# Patient Record
Sex: MALE | Race: WHITE | NOT HISPANIC OR LATINO | Employment: FULL TIME | URBAN - METROPOLITAN AREA
[De-identification: names, ages, dates, MRNs, and addresses within clinical notes are randomized per-mention and may not be internally consistent; named-entity substitution may affect disease eponyms.]

---

## 2017-06-13 ENCOUNTER — TRANSCRIBE ORDERS (OUTPATIENT)
Dept: ADMINISTRATIVE | Age: 42
End: 2017-06-13

## 2017-06-13 ENCOUNTER — APPOINTMENT (OUTPATIENT)
Dept: LAB | Age: 42
End: 2017-06-13
Payer: COMMERCIAL

## 2017-06-13 DIAGNOSIS — E03.9 UNSPECIFIED HYPOTHYROIDISM: ICD-10-CM

## 2017-06-13 DIAGNOSIS — E03.9 UNSPECIFIED HYPOTHYROIDISM: Primary | ICD-10-CM

## 2017-06-13 LAB
T4 FREE SERPL-MCNC: 1.03 NG/DL (ref 0.76–1.46)
TSH SERPL DL<=0.05 MIU/L-ACNC: 5.36 UIU/ML (ref 0.36–3.74)

## 2017-06-13 PROCEDURE — 36415 COLL VENOUS BLD VENIPUNCTURE: CPT

## 2017-06-13 PROCEDURE — 84439 ASSAY OF FREE THYROXINE: CPT

## 2017-06-13 PROCEDURE — 84443 ASSAY THYROID STIM HORMONE: CPT

## 2017-08-26 ENCOUNTER — APPOINTMENT (OUTPATIENT)
Dept: LAB | Age: 42
End: 2017-08-26
Payer: COMMERCIAL

## 2017-08-26 ENCOUNTER — TRANSCRIBE ORDERS (OUTPATIENT)
Dept: ADMINISTRATIVE | Age: 42
End: 2017-08-26

## 2017-08-26 DIAGNOSIS — E03.9 UNSPECIFIED HYPOTHYROIDISM: ICD-10-CM

## 2017-08-26 DIAGNOSIS — E03.9 UNSPECIFIED HYPOTHYROIDISM: Primary | ICD-10-CM

## 2017-08-26 LAB
T4 FREE SERPL-MCNC: 1.02 NG/DL (ref 0.76–1.46)
TSH SERPL DL<=0.05 MIU/L-ACNC: 1.28 UIU/ML (ref 0.36–3.74)

## 2017-08-26 PROCEDURE — 84443 ASSAY THYROID STIM HORMONE: CPT

## 2017-08-26 PROCEDURE — 36415 COLL VENOUS BLD VENIPUNCTURE: CPT

## 2017-08-26 PROCEDURE — 84439 ASSAY OF FREE THYROXINE: CPT

## 2018-03-08 PROCEDURE — 88305 TISSUE EXAM BY PATHOLOGIST: CPT | Performed by: PATHOLOGY

## 2018-03-09 ENCOUNTER — LAB REQUISITION (OUTPATIENT)
Dept: LAB | Facility: HOSPITAL | Age: 43
End: 2018-03-09
Payer: COMMERCIAL

## 2018-03-09 DIAGNOSIS — K52.9 NONINFECTIVE GASTROENTERITIS AND COLITIS: ICD-10-CM

## 2018-03-09 DIAGNOSIS — K64.2 THIRD DEGREE HEMORRHOIDS: ICD-10-CM

## 2018-03-09 DIAGNOSIS — K62.5 HEMORRHAGE OF ANUS AND RECTUM: ICD-10-CM

## 2018-10-04 ENCOUNTER — APPOINTMENT (OUTPATIENT)
Dept: RADIOLOGY | Age: 43
End: 2018-10-04
Attending: FAMILY MEDICINE
Payer: COMMERCIAL

## 2018-10-04 ENCOUNTER — OFFICE VISIT (OUTPATIENT)
Dept: URGENT CARE | Age: 43
End: 2018-10-04
Payer: COMMERCIAL

## 2018-10-04 VITALS
TEMPERATURE: 97.4 F | OXYGEN SATURATION: 99 % | BODY MASS INDEX: 29.78 KG/M2 | SYSTOLIC BLOOD PRESSURE: 137 MMHG | RESPIRATION RATE: 16 BRPM | HEIGHT: 70 IN | WEIGHT: 208 LBS | HEART RATE: 80 BPM | DIASTOLIC BLOOD PRESSURE: 91 MMHG

## 2018-10-04 DIAGNOSIS — T14.90XA INJURY: ICD-10-CM

## 2018-10-04 DIAGNOSIS — S92.415A CLOSED NONDISPLACED FRACTURE OF PROXIMAL PHALANX OF LEFT GREAT TOE, INITIAL ENCOUNTER: Primary | ICD-10-CM

## 2018-10-04 DIAGNOSIS — S99.922A INJURY OF LEFT GREAT TOE, INITIAL ENCOUNTER: ICD-10-CM

## 2018-10-04 PROCEDURE — 73630 X-RAY EXAM OF FOOT: CPT

## 2018-10-04 PROCEDURE — 99213 OFFICE O/P EST LOW 20 MIN: CPT | Performed by: FAMILY MEDICINE

## 2018-10-04 RX ORDER — KRILL/OM-3/DHA/EPA/PHOSPHO/AST 500MG-86MG
1 CAPSULE ORAL DAILY
COMMUNITY

## 2018-10-04 RX ORDER — LEVOTHYROXINE SODIUM 0.12 MG/1
125 TABLET ORAL DAILY
COMMUNITY
End: 2019-03-13 | Stop reason: DRUGHIGH

## 2018-10-04 NOTE — PATIENT INSTRUCTIONS
Rest, elevate left foot, limit activity until seen by Podiatry  Ice to injured area 2 to 3 times a day for 15-20 minutes  Tape toes for comfort  Comfortable footwear  Crutches  Tylenol, or ibuprofen (Advil/Motrin), or try Aleve (please take with food) as needed for pain  Recheck/follow-up with Podiatry as needed    Ronnie Beyer  [phone]

## 2018-10-04 NOTE — PROGRESS NOTES
[facility]        NAME: Irma Bo is a 36 y o  male  : 1975    MRN: [id number]  DATE: 2018  TIME: 7:02 PM    Assessment and Plan   Closed nondisplaced fracture of proximal phalanx of left great toe, initial encounter [S92 415A]  1  Closed nondisplaced fracture of proximal phalanx of left great toe, initial encounter  CANCELED: Post Op Shoe   2  Injury  XR foot 3+ vw left   3  Injury of left great toe, initial encounter  CANCELED: Crutches         Patient Instructions     Patient Instructions   Rest, elevate left foot, limit activity until seen by Podiatry  Ice to injured area 2 to 3 times a day for 15-20 minutes  Tape toes for comfort  Comfortable footwear  Crutches  Tylenol, or ibuprofen (Advil/Motrin), or try Aleve (please take with food) as needed for pain  Recheck/follow-up with Podiatry as needed  Cady Karen  [phone]      Chief Complaint     Chief Complaint   Patient presents with    Foot Injury     BIG TOE BENT BACK DURING MMA CLASS LAST NIGHT    STILL SWOLLEN AND DISCOLORED         History of Present Illness       Patient states his left great toe bent backwards (dorsally) in martial arts class last evening; patient has his left great toe and left 2nd toe taped together; patient states he has crutches at home        Review of Systems   Review of Systems   Musculoskeletal:        Pain base of left great toe         Current Medications       Current Outpatient Prescriptions:     Krill Oil 500 MG CAPS, Take 1 tablet by mouth, Disp: , Rfl:     levothyroxine 125 mcg tablet, Take 125 mcg by mouth daily, Disp: , Rfl:     Current Allergies     Allergies as of 10/04/2018    (No Known Allergies)            The following portions of the patient's history were reviewed and updated as appropriate: allergies, current medications, past family history, past medical history, past social history, past surgical history and problem list      Past Medical History:   Diagnosis Date  Disease of thyroid gland        Past Surgical History:   Procedure Laterality Date    KIDNEY STONE SURGERY  2015       Family History   Problem Relation Age of Onset    No Known Problems Mother     Cancer Father          Medications have been verified          Objective   /91   Pulse 80   Temp (!) 97 4 °F (36 3 °C) (Temporal)   Resp 16   Ht 5' 10" (1 778 m)   Wt 94 3 kg (208 lb)   SpO2 99%   BMI 29 84 kg/m²        Physical Exam     Physical Exam   Musculoskeletal:   Tenderness and slight swelling base of left great toe; intact sensation, capillary refill, and motor exam left great toe       Left foot x-rays - fracture of the base of the proximal phalanx of the left great toe (patient showed x-rays)

## 2018-10-29 ENCOUNTER — APPOINTMENT (OUTPATIENT)
Dept: LAB | Age: 43
End: 2018-10-29
Payer: COMMERCIAL

## 2018-10-29 ENCOUNTER — TRANSCRIBE ORDERS (OUTPATIENT)
Dept: ADMINISTRATIVE | Age: 43
End: 2018-10-29

## 2018-10-29 DIAGNOSIS — M79.675 PAIN IN TOE OF LEFT FOOT: ICD-10-CM

## 2018-10-29 DIAGNOSIS — S92.415S CLOSED NONDISPLACED FRACTURE OF PROXIMAL PHALANX OF LEFT GREAT TOE, SEQUELA: ICD-10-CM

## 2018-10-29 DIAGNOSIS — S92.415S CLOSED NONDISPLACED FRACTURE OF PROXIMAL PHALANX OF LEFT GREAT TOE, SEQUELA: Primary | ICD-10-CM

## 2018-10-29 LAB — 25(OH)D3 SERPL-MCNC: 33.6 NG/ML (ref 30–100)

## 2018-10-29 PROCEDURE — 36415 COLL VENOUS BLD VENIPUNCTURE: CPT

## 2018-10-29 PROCEDURE — 82306 VITAMIN D 25 HYDROXY: CPT

## 2019-03-13 ENCOUNTER — OFFICE VISIT (OUTPATIENT)
Dept: URGENT CARE | Age: 44
End: 2019-03-13
Payer: COMMERCIAL

## 2019-03-13 VITALS
TEMPERATURE: 100.7 F | WEIGHT: 205 LBS | SYSTOLIC BLOOD PRESSURE: 130 MMHG | HEIGHT: 70 IN | BODY MASS INDEX: 29.35 KG/M2 | RESPIRATION RATE: 18 BRPM | OXYGEN SATURATION: 95 % | DIASTOLIC BLOOD PRESSURE: 76 MMHG | HEART RATE: 112 BPM

## 2019-03-13 DIAGNOSIS — R68.89 FLU-LIKE SYMPTOMS: ICD-10-CM

## 2019-03-13 DIAGNOSIS — J02.9 ACUTE PHARYNGITIS, UNSPECIFIED ETIOLOGY: Primary | ICD-10-CM

## 2019-03-13 DIAGNOSIS — J02.9 SORE THROAT: ICD-10-CM

## 2019-03-13 LAB
FLUAV AG SPEC QL: DETECTED
FLUBV AG SPEC QL: NOT DETECTED
RSV B RNA SPEC QL NAA+PROBE: NOT DETECTED
S PYO AG THROAT QL: NEGATIVE

## 2019-03-13 PROCEDURE — 87070 CULTURE OTHR SPECIMN AEROBIC: CPT | Performed by: NURSE PRACTITIONER

## 2019-03-13 PROCEDURE — 87631 RESP VIRUS 3-5 TARGETS: CPT | Performed by: NURSE PRACTITIONER

## 2019-03-13 PROCEDURE — 99213 OFFICE O/P EST LOW 20 MIN: CPT | Performed by: FAMILY MEDICINE

## 2019-03-13 PROCEDURE — 87430 STREP A AG IA: CPT | Performed by: FAMILY MEDICINE

## 2019-03-13 RX ORDER — IBUPROFEN 200 MG
200 TABLET ORAL EVERY 4 HOURS PRN
COMMUNITY

## 2019-03-13 RX ORDER — MELATONIN
1000 DAILY
COMMUNITY
End: 2021-12-27 | Stop reason: ALTCHOICE

## 2019-03-13 RX ORDER — LEVOTHYROXINE SODIUM 137 UG/1
137 TABLET ORAL DAILY
Refills: 0 | COMMUNITY
Start: 2019-03-11 | End: 2021-08-30 | Stop reason: SDUPTHER

## 2019-03-13 RX ORDER — AMOXICILLIN 500 MG/1
500 CAPSULE ORAL EVERY 12 HOURS SCHEDULED
Qty: 20 CAPSULE | Refills: 0 | Status: SHIPPED | OUTPATIENT
Start: 2019-03-13 | End: 2019-03-23

## 2019-03-13 RX ORDER — ACETAMINOPHEN 500 MG
500 TABLET ORAL EVERY 6 HOURS PRN
COMMUNITY

## 2019-03-13 RX ORDER — OSELTAMIVIR PHOSPHATE 75 MG/1
75 CAPSULE ORAL EVERY 12 HOURS SCHEDULED
Qty: 10 CAPSULE | Refills: 0 | Status: SHIPPED | OUTPATIENT
Start: 2019-03-13 | End: 2019-03-18

## 2019-03-13 NOTE — PROGRESS NOTES
[facility]        NAME: Joey Clark is a 39 y o  male  : 1975    MRN: [id number]  DATE: 2019  TIME: 2:57 PM    Assessment and Plan   Acute pharyngitis, unspecified etiology [J02 9]  1  Acute pharyngitis, unspecified etiology  amoxicillin (AMOXIL) 500 mg capsule   2  Sore throat  POCT rapid strepA    Throat culture   3  Flu-like symptoms  oseltamivir (TAMIFLU) 75 mg capsule    Influenza A/B and RSV by PCR         Patient Instructions     Patient Instructions   As we discussed symptoms seem to be consistent with influenza  Flu swab drawn collected today call tomorrow for results  Rapid strep in office was negative  Rest and drink extra fluids  Start Tamiflu if flu testing is negative will start Tamiflu tomorrow  Will also start amoxicillin as there was a exposure to strep and he does have sore throat  Saltwater gargles can be helpful with sore throat  Chloraseptic spray or throat drops  Tylenol or Motrin needed for pain or fever  Follow up with family doctor if no improvement  Go to the ER with any worsening symptoms, chest pain, shortness breath or difficulty breathing  Chief Complaint     Chief Complaint   Patient presents with    Cold Like Symptoms     sore throat and productive cough for 3 days   daughter has strep throat         History of Present Illness   Joey Clark presents to the clinic c/o    This is a 14-year-old male here today with complaints sore throat, body aches, headache, chills and feeling poorly  He states he did have his influenza vaccine this year  He has been taking Tylenol as needed  He states his daughter was recently diagnosed with strep 2 days ago  He denies any shortness of breath or chest pain  He is having some yellow mucus  Review of Systems   Review of Systems   Constitutional: Positive for activity change, chills, fatigue and fever  HENT: Positive for congestion, sinus pressure and sore throat  Negative for sinus pain  Respiratory: Positive for cough  Negative for chest tightness and wheezing  Cardiovascular: Negative  Neurological: Positive for headaches  Psychiatric/Behavioral: Negative  Current Medications     Long-Term Medications   Medication Sig Dispense Refill    cholecalciferol (VITAMIN D3) 1,000 units tablet Take 1,000 Units by mouth daily      ibuprofen (ADVIL) 200 mg tablet Take by mouth      levothyroxine 137 mcg tablet Take 137 mcg by mouth daily  0    [DISCONTINUED] levothyroxine 125 mcg tablet Take 125 mcg by mouth daily         Current Allergies     Allergies as of 03/13/2019    (No Known Allergies)            The following portions of the patient's history were reviewed and updated as appropriate: allergies, current medications, past family history, past medical history, past social history, past surgical history and problem list     Objective   /76 (BP Location: Right arm, Patient Position: Sitting, Cuff Size: Standard)   Pulse (!) 112   Temp (!) 100 7 °F (38 2 °C)   Resp 18   Ht 5' 10" (1 778 m)   Wt 93 kg (205 lb)   SpO2 95%   BMI 29 41 kg/m²        Physical Exam     Physical Exam   Constitutional: He is oriented to person, place, and time  He appears well-developed and well-nourished  HENT:   Right Ear: External ear normal    Left Ear: External ear normal    Posterior pharynx mildly erythemic tonsils 1/4 no exudate   Neck: Normal range of motion  Neck supple  Cardiovascular: Normal rate and regular rhythm  Pulmonary/Chest: Effort normal and breath sounds normal    Neurological: He is alert and oriented to person, place, and time  Skin: Skin is warm and dry  Psychiatric: He has a normal mood and affect  His behavior is normal    Nursing note and vitals reviewed

## 2019-03-13 NOTE — PATIENT INSTRUCTIONS
As we discussed symptoms seem to be consistent with influenza  Flu swab drawn collected today call tomorrow for results  Rapid strep in office was negative  Rest and drink extra fluids  Start Tamiflu if flu testing is negative will start Tamiflu tomorrow  Will also start amoxicillin as there was a exposure to strep and he does have sore throat  Saltwater gargles can be helpful with sore throat  Chloraseptic spray or throat drops  Tylenol or Motrin needed for pain or fever  Follow up with family doctor if no improvement  Go to the ER with any worsening symptoms, chest pain, shortness breath or difficulty breathing

## 2019-03-13 NOTE — LETTER
March 13, 2019     Patient: Wilmar Ricks   YOB: 1975   Date of Visit: 3/13/2019       To Whom It May Concern: It is my medical opinion that Samreen Seo may return to work on 03/18/2019  If you have any questions or concerns, please don't hesitate to call           Sincerely,        KATERINA Stratton    CC: No Recipients

## 2019-03-14 ENCOUNTER — TELEPHONE (OUTPATIENT)
Dept: URGENT CARE | Age: 44
End: 2019-03-14

## 2019-03-14 NOTE — TELEPHONE ENCOUNTER
Message left that influenza test was positive for type a influenza  Patient already has Tamiflu and he is instructed to continue that with hopes that that will shorten the course  Also instructed to follow up with his family doctor if any other concerns

## 2019-03-15 LAB — BACTERIA THROAT CULT: NORMAL

## 2019-06-22 ENCOUNTER — TRANSCRIBE ORDERS (OUTPATIENT)
Dept: ADMINISTRATIVE | Age: 44
End: 2019-06-22

## 2019-06-22 ENCOUNTER — APPOINTMENT (OUTPATIENT)
Dept: LAB | Age: 44
End: 2019-06-22
Payer: COMMERCIAL

## 2019-06-22 DIAGNOSIS — N42.89 ATROPHY OF PROSTATE: ICD-10-CM

## 2019-06-22 DIAGNOSIS — I51.9 MYXEDEMA HEART DISEASE: ICD-10-CM

## 2019-06-22 DIAGNOSIS — E78.2 MIXED HYPERLIPIDEMIA: ICD-10-CM

## 2019-06-22 DIAGNOSIS — D64.9 ANEMIA, UNSPECIFIED TYPE: ICD-10-CM

## 2019-06-22 DIAGNOSIS — D64.9 ANEMIA, UNSPECIFIED TYPE: Primary | ICD-10-CM

## 2019-06-22 DIAGNOSIS — E03.9 MYXEDEMA HEART DISEASE: ICD-10-CM

## 2019-06-22 DIAGNOSIS — R35.0 URINARY FREQUENCY: ICD-10-CM

## 2019-06-22 LAB
ALBUMIN SERPL BCP-MCNC: 4.1 G/DL (ref 3.5–5)
ALP SERPL-CCNC: 72 U/L (ref 46–116)
ALT SERPL W P-5'-P-CCNC: 28 U/L (ref 12–78)
ANION GAP SERPL CALCULATED.3IONS-SCNC: 4 MMOL/L (ref 4–13)
AST SERPL W P-5'-P-CCNC: 14 U/L (ref 5–45)
BASOPHILS # BLD AUTO: 0.02 THOUSANDS/ΜL (ref 0–0.1)
BASOPHILS NFR BLD AUTO: 0 % (ref 0–1)
BILIRUB SERPL-MCNC: 0.37 MG/DL (ref 0.2–1)
BILIRUB UR QL STRIP: NEGATIVE
BUN SERPL-MCNC: 13 MG/DL (ref 5–25)
CALCIUM SERPL-MCNC: 9.3 MG/DL (ref 8.3–10.1)
CHLORIDE SERPL-SCNC: 105 MMOL/L (ref 100–108)
CHOLEST SERPL-MCNC: 207 MG/DL (ref 50–200)
CLARITY UR: CLEAR
CO2 SERPL-SCNC: 26 MMOL/L (ref 21–32)
COLOR UR: YELLOW
CREAT SERPL-MCNC: 0.78 MG/DL (ref 0.6–1.3)
EOSINOPHIL # BLD AUTO: 0.08 THOUSAND/ΜL (ref 0–0.61)
EOSINOPHIL NFR BLD AUTO: 2 % (ref 0–6)
ERYTHROCYTE [DISTWIDTH] IN BLOOD BY AUTOMATED COUNT: 12.9 % (ref 11.6–15.1)
GFR SERPL CREATININE-BSD FRML MDRD: 110 ML/MIN/1.73SQ M
GLUCOSE P FAST SERPL-MCNC: 110 MG/DL (ref 65–99)
GLUCOSE UR STRIP-MCNC: NEGATIVE MG/DL
HCT VFR BLD AUTO: 43.4 % (ref 36.5–49.3)
HDLC SERPL-MCNC: 36 MG/DL (ref 40–60)
HGB BLD-MCNC: 14.3 G/DL (ref 12–17)
HGB UR QL STRIP.AUTO: NEGATIVE
IMM GRANULOCYTES # BLD AUTO: 0.02 THOUSAND/UL (ref 0–0.2)
IMM GRANULOCYTES NFR BLD AUTO: 0 % (ref 0–2)
KETONES UR STRIP-MCNC: NEGATIVE MG/DL
LDLC SERPL CALC-MCNC: 154 MG/DL (ref 0–100)
LEUKOCYTE ESTERASE UR QL STRIP: NEGATIVE
LYMPHOCYTES # BLD AUTO: 0.78 THOUSANDS/ΜL (ref 0.6–4.47)
LYMPHOCYTES NFR BLD AUTO: 16 % (ref 14–44)
MCH RBC QN AUTO: 28.9 PG (ref 26.8–34.3)
MCHC RBC AUTO-ENTMCNC: 32.9 G/DL (ref 31.4–37.4)
MCV RBC AUTO: 88 FL (ref 82–98)
MONOCYTES # BLD AUTO: 0.36 THOUSAND/ΜL (ref 0.17–1.22)
MONOCYTES NFR BLD AUTO: 8 % (ref 4–12)
NEUTROPHILS # BLD AUTO: 3.53 THOUSANDS/ΜL (ref 1.85–7.62)
NEUTS SEG NFR BLD AUTO: 74 % (ref 43–75)
NITRITE UR QL STRIP: NEGATIVE
NONHDLC SERPL-MCNC: 171 MG/DL
NRBC BLD AUTO-RTO: 0 /100 WBCS
PH UR STRIP.AUTO: 6 [PH]
PLATELET # BLD AUTO: 218 THOUSANDS/UL (ref 149–390)
PMV BLD AUTO: 10.9 FL (ref 8.9–12.7)
POTASSIUM SERPL-SCNC: 4.1 MMOL/L (ref 3.5–5.3)
PROT SERPL-MCNC: 7.3 G/DL (ref 6.4–8.2)
PROT UR STRIP-MCNC: NEGATIVE MG/DL
RBC # BLD AUTO: 4.94 MILLION/UL (ref 3.88–5.62)
SODIUM SERPL-SCNC: 135 MMOL/L (ref 136–145)
SP GR UR STRIP.AUTO: 1.02 (ref 1–1.03)
T4 FREE SERPL-MCNC: 1.09 NG/DL (ref 0.76–1.46)
TRIGL SERPL-MCNC: 87 MG/DL
TSH SERPL DL<=0.05 MIU/L-ACNC: 0.38 UIU/ML (ref 0.36–3.74)
UROBILINOGEN UR QL STRIP.AUTO: 0.2 E.U./DL
WBC # BLD AUTO: 4.79 THOUSAND/UL (ref 4.31–10.16)

## 2019-06-22 PROCEDURE — 81003 URINALYSIS AUTO W/O SCOPE: CPT

## 2019-06-22 PROCEDURE — 80053 COMPREHEN METABOLIC PANEL: CPT

## 2019-06-22 PROCEDURE — 85025 COMPLETE CBC W/AUTO DIFF WBC: CPT

## 2019-06-22 PROCEDURE — 84443 ASSAY THYROID STIM HORMONE: CPT

## 2019-06-22 PROCEDURE — 36415 COLL VENOUS BLD VENIPUNCTURE: CPT

## 2019-06-22 PROCEDURE — 80061 LIPID PANEL: CPT

## 2019-06-22 PROCEDURE — 84153 ASSAY OF PSA TOTAL: CPT

## 2019-06-22 PROCEDURE — 84439 ASSAY OF FREE THYROXINE: CPT

## 2019-06-25 LAB — MISCELLANEOUS LAB TEST RESULT: NORMAL

## 2019-09-24 ENCOUNTER — APPOINTMENT (OUTPATIENT)
Dept: RADIOLOGY | Age: 44
End: 2019-09-24
Attending: FAMILY MEDICINE
Payer: COMMERCIAL

## 2019-09-24 ENCOUNTER — OFFICE VISIT (OUTPATIENT)
Dept: URGENT CARE | Age: 44
End: 2019-09-24
Payer: COMMERCIAL

## 2019-09-24 VITALS
DIASTOLIC BLOOD PRESSURE: 81 MMHG | WEIGHT: 215 LBS | OXYGEN SATURATION: 96 % | BODY MASS INDEX: 30.78 KG/M2 | HEART RATE: 77 BPM | SYSTOLIC BLOOD PRESSURE: 135 MMHG | TEMPERATURE: 97.3 F | RESPIRATION RATE: 16 BRPM | HEIGHT: 70 IN

## 2019-09-24 DIAGNOSIS — S20.212A CONTUSION OF LEFT CHEST WALL, INITIAL ENCOUNTER: Primary | ICD-10-CM

## 2019-09-24 DIAGNOSIS — R52 PAIN: ICD-10-CM

## 2019-09-24 LAB
SL AMB  POCT GLUCOSE, UA: NORMAL
SL AMB LEUKOCYTE ESTERASE,UA: NORMAL
SL AMB POCT BILIRUBIN,UA: NORMAL
SL AMB POCT BLOOD,UA: NORMAL
SL AMB POCT CLARITY,UA: CLEAR
SL AMB POCT COLOR,UA: YELLOW
SL AMB POCT KETONES,UA: NORMAL
SL AMB POCT NITRITE,UA: NORMAL
SL AMB POCT PH,UA: 7
SL AMB POCT SPECIFIC GRAVITY,UA: 1.01
SL AMB POCT URINE PROTEIN: NORMAL
SL AMB POCT UROBILINOGEN: 0.2

## 2019-09-24 PROCEDURE — 81002 URINALYSIS NONAUTO W/O SCOPE: CPT | Performed by: FAMILY MEDICINE

## 2019-09-24 PROCEDURE — 71101 X-RAY EXAM UNILAT RIBS/CHEST: CPT

## 2019-09-24 PROCEDURE — 99213 OFFICE O/P EST LOW 20 MIN: CPT | Performed by: FAMILY MEDICINE

## 2019-09-24 NOTE — PATIENT INSTRUCTIONS
Rest, limit heavy activity through next week  Ice to injured area 2 to 3 times a day for 15-20 minutes  Tylenol, or ibuprofen (Advil/Motrin), or try Aleve (please take with food) as needed for pain and inflammation  Recheck/follow-up with family physician  Please go to the hospital emergency department if needed
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
## 2020-01-06 ENCOUNTER — OFFICE VISIT (OUTPATIENT)
Dept: URGENT CARE | Age: 45
End: 2020-01-06
Payer: COMMERCIAL

## 2020-01-06 VITALS
HEART RATE: 94 BPM | WEIGHT: 213 LBS | DIASTOLIC BLOOD PRESSURE: 81 MMHG | RESPIRATION RATE: 18 BRPM | BODY MASS INDEX: 30.49 KG/M2 | SYSTOLIC BLOOD PRESSURE: 120 MMHG | HEIGHT: 70 IN | OXYGEN SATURATION: 98 % | TEMPERATURE: 97.7 F

## 2020-01-06 DIAGNOSIS — J20.8 ACUTE BRONCHITIS DUE TO OTHER SPECIFIED ORGANISMS: Primary | ICD-10-CM

## 2020-01-06 PROCEDURE — 99213 OFFICE O/P EST LOW 20 MIN: CPT | Performed by: PHYSICIAN ASSISTANT

## 2020-01-06 RX ORDER — BENZONATATE 100 MG/1
100 CAPSULE ORAL 3 TIMES DAILY PRN
Qty: 9 CAPSULE | Refills: 0 | Status: SHIPPED | OUTPATIENT
Start: 2020-01-06 | End: 2020-07-10

## 2020-01-06 RX ORDER — ALBUTEROL SULFATE 90 UG/1
1 AEROSOL, METERED RESPIRATORY (INHALATION) EVERY 8 HOURS PRN
Qty: 18 G | Refills: 0 | Status: SHIPPED | OUTPATIENT
Start: 2020-01-06 | End: 2020-01-08

## 2020-01-06 RX ORDER — AZITHROMYCIN 250 MG/1
TABLET, FILM COATED ORAL
Qty: 6 TABLET | Refills: 0 | Status: SHIPPED | OUTPATIENT
Start: 2020-01-06 | End: 2020-01-10

## 2020-01-06 RX ORDER — ALBUTEROL SULFATE 2.5 MG/3ML
2.5 SOLUTION RESPIRATORY (INHALATION) ONCE
Status: COMPLETED | OUTPATIENT
Start: 2020-01-06 | End: 2020-01-06

## 2020-01-06 RX ADMIN — ALBUTEROL SULFATE 2.5 MG: 2.5 SOLUTION RESPIRATORY (INHALATION) at 11:04

## 2020-01-06 NOTE — PATIENT INSTRUCTIONS
Take antibiotics as prescribed  Use Flonase or nasal saline spray for symptomatic treatment  Use inhaler as prescribed if needed  Use Tessalon Perles as prescribed if needed  Warm water gargles  Change toothbrush in 2-3 days  Stay hydrated with lots of water/fluids  Follow-up with PCP in the next 1-2 days for reexamination and to ensure resolution of symptoms  Go to the ED if any fevers, unable to stay hydrated, abdominal pain, chest pain, shortness of breath, chest tightness, wheezing, change in voice, pain or difficulty swallowing, new or worsening symptoms or other concerning symptoms

## 2020-01-06 NOTE — PROGRESS NOTES
[facility]        NAME: Amaury Gann is a 38 y o  male  : 1975    MRN: [id number]  DATE: 2020  TIME: 11:36 AM    Assessment and Plan   Acute bronchitis due to other specified organisms [J20 8]  1  Acute bronchitis due to other specified organisms  albuterol inhalation solution 2 5 mg    azithromycin (ZITHROMAX) 250 mg tablet    benzonatate (TESSALON PERLES) 100 mg capsule    albuterol (VENTOLIN HFA) 90 mcg/act inhaler     Patient feeling well after albuterol treatment  Lungs clear to auscultation bilaterally, no wheezes  Vital signs are stable  Patient has no complaints at this time  Patient requesting discharge at this time  Patient will follow up with PCP in the next 1-2 days or the ER sooner if any new or worsening symptoms    Patient Instructions     Take antibiotics as prescribed  Use Flonase or nasal saline spray for symptomatic treatment  Use inhaler as prescribed if needed  Use Tessalon Perles as prescribed if needed  Warm water gargles  Change toothbrush in 2-3 days  Stay hydrated with lots of water/fluids  Follow-up with PCP in the next 1-2 days for reexamination and to ensure resolution of symptoms  Go to the ED if any fevers, unable to stay hydrated, abdominal pain, chest pain, shortness of breath, chest tightness, wheezing, change in voice, pain or difficulty swallowing, new or worsening symptoms or other concerning symptoms  Chief Complaint     Chief Complaint   Patient presents with   Willistine Cake Like Symptoms     Started New Years Rosy with cough, runny/stuffy nose, fatigue, sore throat and ears blocked  Been taking Musinex and advil cold/sinus  History of Present Illness       62-year-old male presents with intermittent sinus pressure, postnasal drip, intermittent ear pressure, cough that started off dry but is now productive clearish greenish phlegm x1 week    He states he has tried over-the-counter Advil cold and Sinus as well as cough medication with some relief  States it feels like prior sinus infections and bronchitis  States sick contacts with similar cough and cold-like symptoms  States having intermittent sore throat but mainly from the coughing  States eating and drinking normally, staying hydrated with a lot of fluids  Denies any fevers, chest pain, chest tightness, shortness of breath, wheezing, GI/ symptoms or other complaints  Past medical history includes hypothyroidism, states control medications  Denies any history of asthma or other past medical history  Review of Systems   Review of Systems   Constitutional: Negative for activity change, chills, fatigue and fever  HENT: Positive for congestion, ear pain, postnasal drip, rhinorrhea, sinus pressure and sore throat  Negative for facial swelling, trouble swallowing and voice change  Eyes: Negative for itching and visual disturbance  Respiratory: Positive for cough  Negative for chest tightness, shortness of breath and wheezing  Cardiovascular: Negative for chest pain and leg swelling  Gastrointestinal: Negative for abdominal pain, diarrhea, nausea and vomiting  Musculoskeletal: Negative for back pain, joint swelling, neck pain and neck stiffness  Skin: Negative for rash  Neurological: Negative for dizziness, seizures, weakness, numbness and headaches  All other systems reviewed and are negative          Current Medications       Current Outpatient Medications:     acetaminophen (TYLENOL) 500 mg tablet, Take 500 mg by mouth every 6 (six) hours as needed for mild pain or fever, Disp: , Rfl:     cholecalciferol (VITAMIN D3) 1,000 units tablet, Take 1,000 Units by mouth daily, Disp: , Rfl:     ibuprofen (ADVIL) 200 mg tablet, Take by mouth, Disp: , Rfl:     Krill Oil 500 MG CAPS, Take 1 tablet by mouth, Disp: , Rfl:     levothyroxine 137 mcg tablet, Take 137 mcg by mouth daily, Disp: , Rfl: 0    albuterol (VENTOLIN HFA) 90 mcg/act inhaler, Inhale 1 puff every 8 (eight) hours as needed for wheezing for up to 2 days, Disp: 18 g, Rfl: 0    azithromycin (ZITHROMAX) 250 mg tablet, Take 2 tablets today then 1 tablet daily x 4 days, Disp: 6 tablet, Rfl: 0    benzonatate (TESSALON PERLES) 100 mg capsule, Take 1 capsule (100 mg total) by mouth 3 (three) times a day as needed for cough, Disp: 9 capsule, Rfl: 0  No current facility-administered medications for this visit  Current Allergies     Allergies as of 01/06/2020    (No Known Allergies)            The following portions of the patient's history were reviewed and updated as appropriate: allergies, current medications, past family history, past medical history, past social history, past surgical history and problem list      Past Medical History:   Diagnosis Date    Disease of thyroid gland        Past Surgical History:   Procedure Laterality Date    KIDNEY STONE SURGERY  2015       Family History   Problem Relation Age of Onset    No Known Problems Mother     Cancer Father          Medications have been verified  Objective   /81 (BP Location: Left arm, Patient Position: Sitting)   Pulse 94   Temp 97 7 °F (36 5 °C) (Temporal)   Resp 18   Ht 5' 10" (1 778 m)   Wt 96 6 kg (213 lb)   SpO2 98%   BMI 30 56 kg/m²        Physical Exam     Physical Exam   Constitutional: He is oriented to person, place, and time  He appears well-developed and well-nourished  No distress  Smiling, talkative, nontoxic-appearing   HENT:   Mouth/Throat: Uvula is midline and mucous membranes are normal  No uvula swelling  No posterior oropharyngeal edema  TMs intact, pearly grey, mild clear fluid visualized behind TM b/l  Mild PND present with mildly erythematous posterior pharynx  No sinus pressure/discomfort to palpation  Airway patent, handling secretions  Eyes: Pupils are equal, round, and reactive to light  Neck: Normal range of motion  Neck supple     Cardiovascular: Normal rate, regular rhythm and normal heart sounds  Pulmonary/Chest: Effort normal  No respiratory distress  He has wheezes (Mild, bilateral apical expiratory wheeze)  Neurological: He is alert and oriented to person, place, and time  Psychiatric: He has a normal mood and affect  His behavior is normal    Nursing note and vitals reviewed

## 2020-01-06 NOTE — LETTER
January 6, 2020     Patient: Moni Nuñez   YOB: 1975   Date of Visit: 1/6/2020       To Whom It May Concern: It is my medical opinion that Kailash Foots may return to work on 1/8/20  If you have any questions or concerns, please don't hesitate to call           Sincerely,        Nicolle Vasquez PA-C    CC: No Recipients

## 2020-01-08 ENCOUNTER — OFFICE VISIT (OUTPATIENT)
Dept: UROLOGY | Facility: CLINIC | Age: 45
End: 2020-01-08
Payer: COMMERCIAL

## 2020-01-08 VITALS
SYSTOLIC BLOOD PRESSURE: 122 MMHG | WEIGHT: 212.8 LBS | HEART RATE: 92 BPM | BODY MASS INDEX: 30.46 KG/M2 | DIASTOLIC BLOOD PRESSURE: 68 MMHG | HEIGHT: 70 IN

## 2020-01-08 DIAGNOSIS — Z98.890 HISTORY OF EXTRACTION OF RENAL CALCULUS: ICD-10-CM

## 2020-01-08 DIAGNOSIS — Z87.442 HISTORY OF EXTRACTION OF RENAL CALCULUS: ICD-10-CM

## 2020-01-08 DIAGNOSIS — R10.9 LEFT FLANK PAIN: Primary | ICD-10-CM

## 2020-01-08 PROCEDURE — 99213 OFFICE O/P EST LOW 20 MIN: CPT | Performed by: UROLOGY

## 2020-01-08 NOTE — PROGRESS NOTES
Progress Note - Urology  Amaury Gann 38 y o  male MRN: [id number]  Encounter: [id number]      Chief Complaint:   Chief Complaint   Patient presents with    H/O Kidney stones       HPI:     62-year-old male presents for evaluation of flank pain  He has had a past history of renal lithiasis  Treated previously with ureteroscopy and stone extraction as well as by lithotripsy  The pain has subsided  He had no associated hematuria  No dysuria  He had no fever or chills associated with this  The discomfort apparently is somewhat worse with physical activity  He is active with karate      MEDS:    Current Outpatient Medications:     acetaminophen (TYLENOL) 500 mg tablet, Take 500 mg by mouth every 6 (six) hours as needed for mild pain or fever, Disp: , Rfl:     albuterol (VENTOLIN HFA) 90 mcg/act inhaler, Inhale 1 puff every 8 (eight) hours as needed for wheezing for up to 2 days, Disp: 18 g, Rfl: 0    azithromycin (ZITHROMAX) 250 mg tablet, Take 2 tablets today then 1 tablet daily x 4 days, Disp: 6 tablet, Rfl: 0    benzonatate (TESSALON PERLES) 100 mg capsule, Take 1 capsule (100 mg total) by mouth 3 (three) times a day as needed for cough, Disp: 9 capsule, Rfl: 0    cholecalciferol (VITAMIN D3) 1,000 units tablet, Take 1,000 Units by mouth daily, Disp: , Rfl:     ibuprofen (ADVIL) 200 mg tablet, Take by mouth, Disp: , Rfl:     Krill Oil 500 MG CAPS, Take 1 tablet by mouth, Disp: , Rfl:     levothyroxine 137 mcg tablet, Take 137 mcg by mouth daily, Disp: , Rfl: 0      PMH:  Past Medical History:   Diagnosis Date    Disease of thyroid gland     Kidney stone          PSH  Past Surgical History:   Procedure Laterality Date    KIDNEY STONE SURGERY  2015         FH  Family History   Problem Relation Age of Onset    No Known Problems Mother     Cancer Father         SH  Social History     Socioeconomic History    Marital status: Unknown     Spouse name: None    Number of children: None    Years of education: None    Highest education level: None   Occupational History    None   Social Needs    Financial resource strain: None    Food insecurity:     Worry: None     Inability: None    Transportation needs:     Medical: None     Non-medical: None   Tobacco Use    Smoking status: Current Every Day Smoker     Packs/day: 1 00     Types: Cigarettes    Smokeless tobacco: Never Used   Substance and Sexual Activity    Alcohol use: Yes     Comment: OCCASSIONAL    Drug use: No    Sexual activity: None   Lifestyle    Physical activity:     Days per week: None     Minutes per session: None    Stress: None   Relationships    Social connections:     Talks on phone: None     Gets together: None     Attends [employer] service: None     Active member of club or organization: None     Attends meetings of clubs or organizations: None     Relationship status: None    Intimate partner violence:     Fear of current or ex partner: None     Emotionally abused: None     Physically abused: None     Forced sexual activity: None   Other Topics Concern    None   Social History Narrative    None          ROS:  Review of Systems   Constitutional: Negative  Respiratory: Negative  Cardiovascular: Negative  Neurological: Negative  Psychiatric/Behavioral: Negative  Vitals:  Blood pressure 122/68, pulse 92, height 5' 10" (1 778 m), weight 96 5 kg (212 lb 12 8 oz)  Physical Exam:     General Appearance   30-year-old male appears his age in no acute distress  Cardiac   Heart rate is regular no murmur or gallop noted  Pulmonary   Clear to percussion auscultation  Abdomen   Soft, no mass, no liver or spleen enlargement  No bladder distention  No tenderness  Back   No CVA tenderness  Genitalia   Penis unremarkable  Scrotum unremarkable  Neurologic   Grossly physiologic    Lab, Imaging and other studies:  No results found for this or any previous visit (from the past 672 hour(s))  IMPRESSION:   1   Left flank pain   2  History urolithiasis    PLAN:   I will check a CT scan in view of the presentation and past history      Please note :  Voice dictation software has been used to create this document  There may be inadvertent transcription errors

## 2020-01-15 ENCOUNTER — HOSPITAL ENCOUNTER (OUTPATIENT)
Dept: RADIOLOGY | Age: 45
Discharge: HOME | End: 2020-01-15
Payer: COMMERCIAL

## 2020-01-15 DIAGNOSIS — R10.9 LEFT FLANK PAIN: ICD-10-CM

## 2020-01-15 PROCEDURE — 76770 US EXAM ABDO BACK WALL COMP: CPT

## 2020-01-17 ENCOUNTER — TELEPHONE (OUTPATIENT)
Dept: UROLOGY | Facility: CLINIC | Age: 45
End: 2020-01-17

## 2020-01-17 DIAGNOSIS — N20.0 CALCULUS OF KIDNEY: Primary | ICD-10-CM

## 2020-01-17 NOTE — TELEPHONE ENCOUNTER
I did review the ultrasound with the patient and showed that it did not have any evidence of hydronephrosis which could cause the flank pain  He has a 6 mm stone in the lower pole right kidney which a as been noted from previous CT scan in 2014 is unchanged  I did review this with the patient is option is to continue observation versus extracorporeal shockwave lithotripsy  He will take this under consideration    I did suggest that he check a KUB or ultrasound periodically to determine if there is any change

## 2020-02-14 NOTE — TELEPHONE ENCOUNTER
Patient made up his mind and would like to go forward with lithotripsy  Patient requesting for 02/28 as date to get procedure done if possible  Please advise

## 2020-02-17 NOTE — TELEPHONE ENCOUNTER
I called and spoke with patient  I reviewed that if patient was still having left flank pain Dr Sherin Wiley recommended obtaining CT stone study prior to planning definitive stone treatment  Patient reports he is no longer having left flank pain  He would like to proceed with ESWL for right sided 6 mm stone  Please advise next steps

## 2020-02-17 NOTE — TELEPHONE ENCOUNTER
Spoke with patient  Advised on Dr Dianne Mccann recommendation for KUB prior to ordering ESWL procedure  Patient agreeable, will got for KUB on Saturday  Instructed to contact office next week for results and decision on stone treatment  All questions answered

## 2020-02-17 NOTE — TELEPHONE ENCOUNTER
Patient states left flank pain has resolved  He wishes to manage the right renal calculus    I will check a KUB prior to decision on definitive management if he can be visualized on imaging study than ESWL may be effective

## 2020-02-17 NOTE — TELEPHONE ENCOUNTER
Pt calling in regards to message he would like to speak with Dr Stone or clinical to clarify results and options

## 2020-02-22 ENCOUNTER — APPOINTMENT (OUTPATIENT)
Dept: RADIOLOGY | Age: 45
End: 2020-02-22
Payer: COMMERCIAL

## 2020-02-22 DIAGNOSIS — N20.0 CALCULUS OF KIDNEY: ICD-10-CM

## 2020-02-22 PROCEDURE — 74018 RADEX ABDOMEN 1 VIEW: CPT

## 2020-02-25 ENCOUNTER — TELEPHONE (OUTPATIENT)
Dept: UROLOGY | Facility: CLINIC | Age: 45
End: 2020-02-25

## 2020-02-25 NOTE — TELEPHONE ENCOUNTER
I did inform the patient that the stone on the right kidney has probably grown a little since his last imaging study  He is concerned that the stone will move and cause pain he would like to proceed with an option of ESWL    I will refer him to one of the providers to get this done on a nonurgent basis

## 2020-02-25 NOTE — TELEPHONE ENCOUNTER
I did inform the patient that the stone on the right kidney has probably grown a little since his last imaging study  He is concerned that the stone will move and cause pain he would like to proceed with an option of ESWL    I will refer him to one of the providers to get this done on a nonurgent basis      Electronically signed by Jyoti Cisneros MD at 2/25/2020  7:51 AM

## 2020-02-26 ENCOUNTER — PREP FOR PROCEDURE (OUTPATIENT)
Dept: UROLOGY | Facility: CLINIC | Age: 45
End: 2020-02-26

## 2020-02-26 DIAGNOSIS — N20.0 RENAL CALCULUS, RIGHT: Primary | ICD-10-CM

## 2020-02-26 RX ORDER — SODIUM CHLORIDE, SODIUM LACTATE, POTASSIUM CHLORIDE, CALCIUM CHLORIDE 600; 310; 30; 20 MG/100ML; MG/100ML; MG/100ML; MG/100ML
125 INJECTION, SOLUTION INTRAVENOUS CONTINUOUS
Status: CANCELLED | OUTPATIENT
Start: 2020-02-26

## 2020-02-28 PROBLEM — N20.0 RENAL CALCULUS, RIGHT: Status: ACTIVE | Noted: 2020-02-28

## 2020-02-28 NOTE — TELEPHONE ENCOUNTER
Spoke w PT and am holding date of 3/26/20 at [address]  PT is aware that this a pending date and I  am waiting for approval from the provider  Once ok'd I will call PT to confirm and proceed with scheduling

## 2020-02-29 DIAGNOSIS — J20.8 ACUTE BRONCHITIS DUE TO OTHER SPECIFIED ORGANISMS: ICD-10-CM

## 2020-02-29 RX ORDER — ALBUTEROL SULFATE 90 UG/1
1 AEROSOL, METERED RESPIRATORY (INHALATION) EVERY 8 HOURS PRN
Qty: 18 INHALER | Refills: 0 | OUTPATIENT
Start: 2020-02-29 | End: 2020-03-02

## 2020-03-02 ENCOUNTER — PREP FOR PROCEDURE (OUTPATIENT)
Dept: UROLOGY | Facility: CLINIC | Age: 45
End: 2020-03-02

## 2020-03-02 DIAGNOSIS — N20.0 CALCULUS OF KIDNEY: Primary | ICD-10-CM

## 2020-03-02 NOTE — TELEPHONE ENCOUNTER
I spoke w PT and conf his procedure for  3/26/19 at [facility]  Dr Mikhail Winn did order pre-op testing and PT is aware the orders have been placed and to have testing done at any one of our  facilities  I will be e-mailing him our surgical packet and he is aware to contact me via phone or e-mail with any questions

## 2020-03-03 NOTE — TELEPHONE ENCOUNTER
Correspondence for Surgery Coordinator    Patient calling to speak with Surgery Coordinator in regards to upcoming surgery        Patient can be reached at [phone]

## 2020-03-03 NOTE — TELEPHONE ENCOUNTER
I have been in touch with the patient via e-mail  Per patient request we have now rescheduled his procedure to 4/3/20 at [facility] w Dr Dontae Adair  PT is aware to have PAT done on March 20th and to contact me with any questions or concerns

## 2020-03-15 ENCOUNTER — APPOINTMENT (OUTPATIENT)
Dept: LAB | Age: 45
End: 2020-03-15
Payer: COMMERCIAL

## 2020-03-15 DIAGNOSIS — N20.0 CALCULUS OF KIDNEY: ICD-10-CM

## 2020-03-15 LAB
ANION GAP SERPL CALCULATED.3IONS-SCNC: 5 MMOL/L (ref 4–13)
APTT PPP: 29 SECONDS (ref 23–37)
BASOPHILS # BLD AUTO: 0.03 THOUSANDS/ΜL (ref 0–0.1)
BASOPHILS NFR BLD AUTO: 1 % (ref 0–1)
BUN SERPL-MCNC: 15 MG/DL (ref 5–25)
CALCIUM SERPL-MCNC: 8.9 MG/DL (ref 8.3–10.1)
CHLORIDE SERPL-SCNC: 109 MMOL/L (ref 100–108)
CO2 SERPL-SCNC: 25 MMOL/L (ref 21–32)
CREAT SERPL-MCNC: 0.82 MG/DL (ref 0.6–1.3)
EOSINOPHIL # BLD AUTO: 0.06 THOUSAND/ΜL (ref 0–0.61)
EOSINOPHIL NFR BLD AUTO: 1 % (ref 0–6)
ERYTHROCYTE [DISTWIDTH] IN BLOOD BY AUTOMATED COUNT: 12.4 % (ref 11.6–15.1)
GFR SERPL CREATININE-BSD FRML MDRD: 107 ML/MIN/1.73SQ M
GLUCOSE P FAST SERPL-MCNC: 115 MG/DL (ref 65–99)
HCT VFR BLD AUTO: 45.6 % (ref 36.5–49.3)
HGB BLD-MCNC: 14.9 G/DL (ref 12–17)
IMM GRANULOCYTES # BLD AUTO: 0.04 THOUSAND/UL (ref 0–0.2)
IMM GRANULOCYTES NFR BLD AUTO: 1 % (ref 0–2)
INR PPP: 0.93 (ref 0.84–1.19)
LYMPHOCYTES # BLD AUTO: 0.86 THOUSANDS/ΜL (ref 0.6–4.47)
LYMPHOCYTES NFR BLD AUTO: 20 % (ref 14–44)
MCH RBC QN AUTO: 28.9 PG (ref 26.8–34.3)
MCHC RBC AUTO-ENTMCNC: 32.7 G/DL (ref 31.4–37.4)
MCV RBC AUTO: 89 FL (ref 82–98)
MONOCYTES # BLD AUTO: 0.36 THOUSAND/ΜL (ref 0.17–1.22)
MONOCYTES NFR BLD AUTO: 8 % (ref 4–12)
NEUTROPHILS # BLD AUTO: 3.07 THOUSANDS/ΜL (ref 1.85–7.62)
NEUTS SEG NFR BLD AUTO: 69 % (ref 43–75)
NRBC BLD AUTO-RTO: 0 /100 WBCS
PLATELET # BLD AUTO: 216 THOUSANDS/UL (ref 149–390)
PMV BLD AUTO: 11.1 FL (ref 8.9–12.7)
POTASSIUM SERPL-SCNC: 4.2 MMOL/L (ref 3.5–5.3)
PROTHROMBIN TIME: 12.1 SECONDS (ref 11.6–14.5)
RBC # BLD AUTO: 5.15 MILLION/UL (ref 3.88–5.62)
SODIUM SERPL-SCNC: 139 MMOL/L (ref 136–145)
WBC # BLD AUTO: 4.42 THOUSAND/UL (ref 4.31–10.16)

## 2020-03-15 PROCEDURE — 85610 PROTHROMBIN TIME: CPT

## 2020-03-15 PROCEDURE — 85730 THROMBOPLASTIN TIME PARTIAL: CPT

## 2020-03-15 PROCEDURE — 85025 COMPLETE CBC W/AUTO DIFF WBC: CPT

## 2020-03-15 PROCEDURE — 87086 URINE CULTURE/COLONY COUNT: CPT

## 2020-03-15 PROCEDURE — 80048 BASIC METABOLIC PNL TOTAL CA: CPT

## 2020-03-15 PROCEDURE — 36415 COLL VENOUS BLD VENIPUNCTURE: CPT

## 2020-03-16 LAB — BACTERIA UR CULT: NORMAL

## 2020-03-17 ENCOUNTER — TELEPHONE (OUTPATIENT)
Dept: UROLOGY | Facility: MEDICAL CENTER | Age: 45
End: 2020-03-17

## 2020-03-17 NOTE — TELEPHONE ENCOUNTER
Spoke with the patient; His urine culture was negative, showing no signs of bacterial growth  Patient is pleased and has no questions or concerns to be addressed at this time

## 2020-03-17 NOTE — TELEPHONE ENCOUNTER
----- Message from Gee Donaldson  sent at 3/17/2020  1:45 PM EDT -----  Pam Lam to notify of no growth and urine culture

## 2020-04-16 ENCOUNTER — AMB VIDEO VISIT (OUTPATIENT)
Dept: URGENT CARE | Facility: MEDICAL CENTER | Age: 45
End: 2020-04-16

## 2020-04-16 ENCOUNTER — AMB VIDEO VISIT (OUTPATIENT)
Dept: OTHER | Facility: HOSPITAL | Age: 45
End: 2020-04-16

## 2020-04-16 DIAGNOSIS — L04.9 ACUTE LYMPHADENITIS: Primary | ICD-10-CM

## 2020-04-16 PROCEDURE — EVISIT: Performed by: FAMILY MEDICINE

## 2020-04-16 RX ORDER — AMOXICILLIN AND CLAVULANATE POTASSIUM 875; 125 MG/1; MG/1
1 TABLET, FILM COATED ORAL EVERY 12 HOURS SCHEDULED
Qty: 14 TABLET | Refills: 0 | Status: SHIPPED | OUTPATIENT
Start: 2020-04-16 | End: 2020-04-23

## 2020-04-27 NOTE — TELEPHONE ENCOUNTER
LM for PT that tentative date of May 1 is not an option  Advised I will be in touch once we have found appropriate date

## 2020-06-09 ENCOUNTER — TELEPHONE (OUTPATIENT)
Dept: UROLOGY | Facility: MEDICAL CENTER | Age: 45
End: 2020-06-09

## 2020-06-10 ENCOUNTER — PREP FOR PROCEDURE (OUTPATIENT)
Dept: UROLOGY | Facility: CLINIC | Age: 45
End: 2020-06-10

## 2020-06-10 DIAGNOSIS — N20.0 RENAL CALCULUS, RIGHT: Primary | ICD-10-CM

## 2020-06-10 DIAGNOSIS — N20.0 CALCULUS OF KIDNEY: Primary | ICD-10-CM

## 2020-07-02 ENCOUNTER — APPOINTMENT (OUTPATIENT)
Dept: LAB | Age: 45
End: 2020-07-02
Payer: COMMERCIAL

## 2020-07-02 ENCOUNTER — APPOINTMENT (OUTPATIENT)
Dept: RADIOLOGY | Age: 45
End: 2020-07-02
Payer: COMMERCIAL

## 2020-07-02 ENCOUNTER — TRANSCRIBE ORDERS (OUTPATIENT)
Dept: ADMINISTRATIVE | Age: 45
End: 2020-07-02

## 2020-07-02 DIAGNOSIS — I10 ESSENTIAL HYPERTENSION, BENIGN: ICD-10-CM

## 2020-07-02 DIAGNOSIS — E78.2 MIXED HYPERLIPIDEMIA: ICD-10-CM

## 2020-07-02 DIAGNOSIS — IMO0002 VITAMIN DISEASE: ICD-10-CM

## 2020-07-02 DIAGNOSIS — N20.0 RENAL CALCULUS, RIGHT: ICD-10-CM

## 2020-07-02 DIAGNOSIS — R35.0 URINARY FREQUENCY: ICD-10-CM

## 2020-07-02 DIAGNOSIS — N20.0 CALCULUS OF KIDNEY: ICD-10-CM

## 2020-07-02 DIAGNOSIS — N42.89 ATROPHY OF PROSTATE: ICD-10-CM

## 2020-07-02 DIAGNOSIS — D64.9 ANEMIA, UNSPECIFIED TYPE: ICD-10-CM

## 2020-07-02 DIAGNOSIS — D64.9 ANEMIA, UNSPECIFIED TYPE: Primary | ICD-10-CM

## 2020-07-02 LAB
BASOPHILS # BLD AUTO: 0.01 THOUSANDS/ΜL (ref 0–0.1)
BASOPHILS NFR BLD AUTO: 0 % (ref 0–1)
EOSINOPHIL # BLD AUTO: 0.04 THOUSAND/ΜL (ref 0–0.61)
EOSINOPHIL NFR BLD AUTO: 1 % (ref 0–6)
ERYTHROCYTE [DISTWIDTH] IN BLOOD BY AUTOMATED COUNT: 13 % (ref 11.6–15.1)
HCT VFR BLD AUTO: 42.3 % (ref 36.5–49.3)
HGB BLD-MCNC: 13.8 G/DL (ref 12–17)
IMM GRANULOCYTES # BLD AUTO: 0.03 THOUSAND/UL (ref 0–0.2)
IMM GRANULOCYTES NFR BLD AUTO: 1 % (ref 0–2)
LYMPHOCYTES # BLD AUTO: 0.84 THOUSANDS/ΜL (ref 0.6–4.47)
LYMPHOCYTES NFR BLD AUTO: 19 % (ref 14–44)
MCH RBC QN AUTO: 28.8 PG (ref 26.8–34.3)
MCHC RBC AUTO-ENTMCNC: 32.6 G/DL (ref 31.4–37.4)
MCV RBC AUTO: 88 FL (ref 82–98)
MONOCYTES # BLD AUTO: 0.25 THOUSAND/ΜL (ref 0.17–1.22)
MONOCYTES NFR BLD AUTO: 6 % (ref 4–12)
NEUTROPHILS # BLD AUTO: 3.21 THOUSANDS/ΜL (ref 1.85–7.62)
NEUTS SEG NFR BLD AUTO: 73 % (ref 43–75)
NRBC BLD AUTO-RTO: 0 /100 WBCS
PLATELET # BLD AUTO: 205 THOUSANDS/UL (ref 149–390)
PMV BLD AUTO: 11.8 FL (ref 8.9–12.7)
RBC # BLD AUTO: 4.79 MILLION/UL (ref 3.88–5.62)
WBC # BLD AUTO: 4.38 THOUSAND/UL (ref 4.31–10.16)

## 2020-07-02 PROCEDURE — 84153 ASSAY OF PSA TOTAL: CPT

## 2020-07-02 PROCEDURE — 84439 ASSAY OF FREE THYROXINE: CPT

## 2020-07-02 PROCEDURE — 81003 URINALYSIS AUTO W/O SCOPE: CPT | Performed by: INTERNAL MEDICINE

## 2020-07-02 PROCEDURE — 85730 THROMBOPLASTIN TIME PARTIAL: CPT

## 2020-07-02 PROCEDURE — 85025 COMPLETE CBC W/AUTO DIFF WBC: CPT

## 2020-07-02 PROCEDURE — 74018 RADEX ABDOMEN 1 VIEW: CPT

## 2020-07-02 PROCEDURE — 85610 PROTHROMBIN TIME: CPT

## 2020-07-02 PROCEDURE — 80061 LIPID PANEL: CPT

## 2020-07-02 PROCEDURE — 82306 VITAMIN D 25 HYDROXY: CPT

## 2020-07-02 PROCEDURE — 80053 COMPREHEN METABOLIC PANEL: CPT

## 2020-07-02 PROCEDURE — 36415 COLL VENOUS BLD VENIPUNCTURE: CPT

## 2020-07-02 PROCEDURE — 84443 ASSAY THYROID STIM HORMONE: CPT

## 2020-07-03 LAB
25(OH)D3 SERPL-MCNC: 29 NG/ML (ref 30–100)
ALBUMIN SERPL BCP-MCNC: 4.1 G/DL (ref 3.5–5)
ALP SERPL-CCNC: 65 U/L (ref 46–116)
ALT SERPL W P-5'-P-CCNC: 22 U/L (ref 12–78)
ANION GAP SERPL CALCULATED.3IONS-SCNC: 6 MMOL/L (ref 4–13)
APTT PPP: 30 SECONDS (ref 23–37)
AST SERPL W P-5'-P-CCNC: 16 U/L (ref 5–45)
BILIRUB SERPL-MCNC: 0.58 MG/DL (ref 0.2–1)
BILIRUB UR QL STRIP: NEGATIVE
BUN SERPL-MCNC: 11 MG/DL (ref 5–25)
CALCIUM SERPL-MCNC: 9 MG/DL (ref 8.3–10.1)
CHLORIDE SERPL-SCNC: 106 MMOL/L (ref 100–108)
CHOLEST SERPL-MCNC: 215 MG/DL (ref 50–200)
CLARITY UR: NORMAL
CO2 SERPL-SCNC: 25 MMOL/L (ref 21–32)
COLOR UR: YELLOW
CREAT SERPL-MCNC: 0.79 MG/DL (ref 0.6–1.3)
GFR SERPL CREATININE-BSD FRML MDRD: 108 ML/MIN/1.73SQ M
GLUCOSE P FAST SERPL-MCNC: 85 MG/DL (ref 65–99)
GLUCOSE UR STRIP-MCNC: NEGATIVE MG/DL
HDLC SERPL-MCNC: 38 MG/DL
HGB UR QL STRIP.AUTO: NEGATIVE
INR PPP: 0.93 (ref 0.84–1.19)
KETONES UR STRIP-MCNC: NEGATIVE MG/DL
LDLC SERPL CALC-MCNC: 156 MG/DL (ref 0–100)
LEUKOCYTE ESTERASE UR QL STRIP: NEGATIVE
NITRITE UR QL STRIP: NEGATIVE
NONHDLC SERPL-MCNC: 177 MG/DL
PH UR STRIP.AUTO: 5.5 [PH]
POTASSIUM SERPL-SCNC: 4.1 MMOL/L (ref 3.5–5.3)
PROT SERPL-MCNC: 7.3 G/DL (ref 6.4–8.2)
PROT UR STRIP-MCNC: NEGATIVE MG/DL
PROTHROMBIN TIME: 12.5 SECONDS (ref 11.6–14.5)
SODIUM SERPL-SCNC: 137 MMOL/L (ref 136–145)
SP GR UR STRIP.AUTO: 1.02 (ref 1–1.03)
T4 FREE SERPL-MCNC: 1.22 NG/DL (ref 0.76–1.46)
TRIGL SERPL-MCNC: 106 MG/DL
TSH SERPL DL<=0.05 MIU/L-ACNC: 0.38 UIU/ML (ref 0.36–3.74)
UROBILINOGEN UR QL STRIP.AUTO: 0.2 E.U./DL

## 2020-07-07 LAB — MISCELLANEOUS LAB TEST RESULT: NORMAL

## 2020-07-10 ENCOUNTER — OFFICE VISIT (OUTPATIENT)
Dept: UROLOGY | Facility: CLINIC | Age: 45
End: 2020-07-10
Payer: COMMERCIAL

## 2020-07-10 VITALS
WEIGHT: 209 LBS | DIASTOLIC BLOOD PRESSURE: 64 MMHG | HEIGHT: 70 IN | SYSTOLIC BLOOD PRESSURE: 104 MMHG | TEMPERATURE: 97.3 F | HEART RATE: 84 BPM | BODY MASS INDEX: 29.92 KG/M2

## 2020-07-10 DIAGNOSIS — N20.0 NEPHROLITHIASIS: Primary | ICD-10-CM

## 2020-07-10 DIAGNOSIS — N20.0 CALCULUS OF KIDNEY: ICD-10-CM

## 2020-07-10 LAB
SL AMB  POCT GLUCOSE, UA: NORMAL
SL AMB LEUKOCYTE ESTERASE,UA: NORMAL
SL AMB POCT BILIRUBIN,UA: NORMAL
SL AMB POCT BLOOD,UA: NORMAL
SL AMB POCT CLARITY,UA: CLEAR
SL AMB POCT COLOR,UA: YELLOW
SL AMB POCT KETONES,UA: NORMAL
SL AMB POCT NITRITE,UA: NORMAL
SL AMB POCT PH,UA: 6.5
SL AMB POCT SPECIFIC GRAVITY,UA: 1.02
SL AMB POCT URINE PROTEIN: NORMAL
SL AMB POCT UROBILINOGEN: 0.2

## 2020-07-10 PROCEDURE — 87086 URINE CULTURE/COLONY COUNT: CPT | Performed by: PHYSICIAN ASSISTANT

## 2020-07-10 PROCEDURE — 81002 URINALYSIS NONAUTO W/O SCOPE: CPT | Performed by: PHYSICIAN ASSISTANT

## 2020-07-10 PROCEDURE — U0003 INFECTIOUS AGENT DETECTION BY NUCLEIC ACID (DNA OR RNA); SEVERE ACUTE RESPIRATORY SYNDROME CORONAVIRUS 2 (SARS-COV-2) (CORONAVIRUS DISEASE [COVID-19]), AMPLIFIED PROBE TECHNIQUE, MAKING USE OF HIGH THROUGHPUT TECHNOLOGIES AS DESCRIBED BY CMS-2020-01-R: HCPCS

## 2020-07-10 PROCEDURE — 99213 OFFICE O/P EST LOW 20 MIN: CPT | Performed by: PHYSICIAN ASSISTANT

## 2020-07-10 NOTE — H&P (VIEW-ONLY)
1  Nephrolithiasis  POCT urine dip    Urine culture         Assessment and plan:       1  Nephrolithiasis  - scheduled for ESWL 7/17/2020    I reviewed the preoperative, intraoperative, postoperative course for his upcoming procedure  Reviewed the risks of the procedure including but not limited to bleeding, renal hematoma, infection, inability to break up stone, need for additional procedures, and possible ureteral stent  Patient verbalized understanding  He is aware to remain NPO after midnight  COVID testing already performed an currently pending  Patient's urine specimen from the office today will be submitted for culture  All questions answered  Catilyn Leiva PA-C      Chief Complaint     Nephrolithiasis    History of Present Illness     Sybil Stroud is a 38 y o  male with a history of flank pain and nephrolithiasis presenting for follow-up  Patient had previously been seen in consultation with Dr Alvin Thorpe  He has a history nephrolithiasis is previously treated with ureteroscopy as well as ESWL  Patient had a recent KUB (07/02/2020 and which revealed stable right renal calculus  Patient is scheduled for right ESWL on 07/17/2020 with Dr Doni Henriquez  Patient has been doing well over the past few months  Denies any flank pain, dysuria, gross hematuria, nausea, vomiting, fevers, or chills  Denies use of anticoagulation  Laboratory     Lab Results   Component Value Date    CREATININE 0 79 07/02/2020       Lab Results   Component Value Date    PSA 0 3 05/31/2016    PSA 0 3 05/13/2014       Review of Systems     Review of Systems   Constitutional: Negative for activity change, appetite change, chills, diaphoresis, fatigue, fever and unexpected weight change  Respiratory: Negative for chest tightness and shortness of breath  Cardiovascular: Negative for chest pain, palpitations and leg swelling     Gastrointestinal: Negative for abdominal distention, abdominal pain, constipation, diarrhea, nausea and vomiting  Genitourinary: Negative for decreased urine volume, difficulty urinating, dysuria, enuresis, flank pain, frequency, genital sores, hematuria and urgency  Musculoskeletal: Negative for back pain, gait problem and myalgias  Skin: Negative for color change, pallor, rash and wound  Psychiatric/Behavioral: Negative for behavioral problems  The patient is not nervous/anxious  Allergies     No Known Allergies    Physical Exam     Physical Exam   Constitutional: He is oriented to person, place, and time  He appears well-developed and well-nourished  No distress  HENT:   Head: Normocephalic and atraumatic  Right Ear: External ear normal    Left Ear: External ear normal    Nose: Nose normal    Eyes: Conjunctivae are normal  Right eye exhibits no discharge  Left eye exhibits no discharge  Neck: Normal range of motion  No tracheal deviation present  Cardiovascular: Normal rate, regular rhythm and normal heart sounds  Exam reveals no gallop and no friction rub  No murmur heard  Pulmonary/Chest: Effort normal and breath sounds normal  No stridor  No respiratory distress  He has no wheezes  Musculoskeletal: He exhibits no edema or deformity  Neurological: He is alert and oriented to person, place, and time  Skin: No rash noted  He is not diaphoretic  No erythema  Psychiatric: He has a normal mood and affect   His behavior is normal          Vital Signs     Vitals:    07/10/20 1514   BP: 104/64   Pulse: 84   Temp: (!) 97 3 °F (36 3 °C)   Weight: 94 8 kg (209 lb)   Height: 5' 10" (1 778 m)         Current Medications       Current Outpatient Medications:     acetaminophen (TYLENOL) 500 mg tablet, Take 500 mg by mouth every 6 (six) hours as needed for mild pain or fever, Disp: , Rfl:     cholecalciferol (VITAMIN D3) 1,000 units tablet, Take 1,000 Units by mouth daily, Disp: , Rfl:     ibuprofen (ADVIL) 200 mg tablet, Take by mouth, Disp: , Rfl:     Krill Oil 500 MG CAPS, Take 1 tablet by mouth, Disp: , Rfl:     levothyroxine 137 mcg tablet, Take 137 mcg by mouth daily, Disp: , Rfl: 0    benzonatate (TESSALON PERLES) 100 mg capsule, Take 1 capsule (100 mg total) by mouth 3 (three) times a day as needed for cough (Patient not taking: Reported on 7/10/2020), Disp: 9 capsule, Rfl: 0      Active Problems     Patient Active Problem List   Diagnosis    Renal calculus, right         Past Medical History     Past Medical History:   Diagnosis Date    Disease of thyroid gland     Kidney stone          Surgical History     Past Surgical History:   Procedure Laterality Date    KIDNEY STONE SURGERY  2015         Family History     Family History   Problem Relation Age of Onset    No Known Problems Mother     Cancer Father          Social History     Social History       Radiology

## 2020-07-10 NOTE — PROGRESS NOTES
1  Nephrolithiasis  POCT urine dip    Urine culture         Assessment and plan:       1  Nephrolithiasis  - scheduled for ESWL 7/17/2020    I reviewed the preoperative, intraoperative, postoperative course for his upcoming procedure  Reviewed the risks of the procedure including but not limited to bleeding, renal hematoma, infection, inability to break up stone, need for additional procedures, and possible ureteral stent  Patient verbalized understanding  He is aware to remain NPO after midnight  COVID testing already performed an currently pending  Patient's urine specimen from the office today will be submitted for culture  All questions answered  Dash Smith PA-C      Chief Complaint     Nephrolithiasis    History of Present Illness     Joey Clark is a 38 y o  male with a history of flank pain and nephrolithiasis presenting for follow-up  Patient had previously been seen in consultation with Dr Jessica Quinonez  He has a history nephrolithiasis is previously treated with ureteroscopy as well as ESWL  Patient had a recent KUB (07/02/2020 and which revealed stable right renal calculus  Patient is scheduled for right ESWL on 07/17/2020 with Dr Alda Dorsey  Patient has been doing well over the past few months  Denies any flank pain, dysuria, gross hematuria, nausea, vomiting, fevers, or chills  Denies use of anticoagulation  Laboratory     Lab Results   Component Value Date    CREATININE 0 79 07/02/2020       Lab Results   Component Value Date    PSA 0 3 05/31/2016    PSA 0 3 05/13/2014       Review of Systems     Review of Systems   Constitutional: Negative for activity change, appetite change, chills, diaphoresis, fatigue, fever and unexpected weight change  Respiratory: Negative for chest tightness and shortness of breath  Cardiovascular: Negative for chest pain, palpitations and leg swelling     Gastrointestinal: Negative for abdominal distention, abdominal pain, constipation, diarrhea, nausea and vomiting  Genitourinary: Negative for decreased urine volume, difficulty urinating, dysuria, enuresis, flank pain, frequency, genital sores, hematuria and urgency  Musculoskeletal: Negative for back pain, gait problem and myalgias  Skin: Negative for color change, pallor, rash and wound  Psychiatric/Behavioral: Negative for behavioral problems  The patient is not nervous/anxious  Allergies     No Known Allergies    Physical Exam     Physical Exam   Constitutional: He is oriented to person, place, and time  He appears well-developed and well-nourished  No distress  HENT:   Head: Normocephalic and atraumatic  Right Ear: External ear normal    Left Ear: External ear normal    Nose: Nose normal    Eyes: Conjunctivae are normal  Right eye exhibits no discharge  Left eye exhibits no discharge  Neck: Normal range of motion  No tracheal deviation present  Cardiovascular: Normal rate, regular rhythm and normal heart sounds  Exam reveals no gallop and no friction rub  No murmur heard  Pulmonary/Chest: Effort normal and breath sounds normal  No stridor  No respiratory distress  He has no wheezes  Musculoskeletal: He exhibits no edema or deformity  Neurological: He is alert and oriented to person, place, and time  Skin: No rash noted  He is not diaphoretic  No erythema  Psychiatric: He has a normal mood and affect   His behavior is normal          Vital Signs     Vitals:    07/10/20 1514   BP: 104/64   Pulse: 84   Temp: (!) 97 3 °F (36 3 °C)   Weight: 94 8 kg (209 lb)   Height: 5' 10" (1 778 m)         Current Medications       Current Outpatient Medications:     acetaminophen (TYLENOL) 500 mg tablet, Take 500 mg by mouth every 6 (six) hours as needed for mild pain or fever, Disp: , Rfl:     cholecalciferol (VITAMIN D3) 1,000 units tablet, Take 1,000 Units by mouth daily, Disp: , Rfl:     ibuprofen (ADVIL) 200 mg tablet, Take by mouth, Disp: , Rfl:     Krill Oil 500 MG CAPS, Take 1 tablet by mouth, Disp: , Rfl:     levothyroxine 137 mcg tablet, Take 137 mcg by mouth daily, Disp: , Rfl: 0    benzonatate (TESSALON PERLES) 100 mg capsule, Take 1 capsule (100 mg total) by mouth 3 (three) times a day as needed for cough (Patient not taking: Reported on 7/10/2020), Disp: 9 capsule, Rfl: 0      Active Problems     Patient Active Problem List   Diagnosis    Renal calculus, right         Past Medical History     Past Medical History:   Diagnosis Date    Disease of thyroid gland     Kidney stone          Surgical History     Past Surgical History:   Procedure Laterality Date    KIDNEY STONE SURGERY  2015         Family History     Family History   Problem Relation Age of Onset    No Known Problems Mother     Cancer Father          Social History     Social History       Radiology

## 2020-07-10 NOTE — PRE-PROCEDURE INSTRUCTIONS
Pre-Surgery Instructions:   Medication Instructions    acetaminophen (TYLENOL) 500 mg tablet Instructed patient per Anesthesia Guidelines   cholecalciferol (VITAMIN D3) 1,000 units tablet Instructed patient per Anesthesia Guidelines   ibuprofen (ADVIL) 200 mg tablet Instructed patient per Anesthesia Guidelines   Krill Oil 500 MG CAPS Instructed patient per Anesthesia Guidelines   levothyroxine 137 mcg tablet Instructed patient per Anesthesia Guidelines  Have you had / have a sore throat? No  have you had / have a cough less than 1 week? No  Have you had / have a fever greater than 100 0 - 100  4? No  Are you experiencing any shortness of breath? No    Review with patient via phone medications and showering instructions  Advice stop vitamins,NSAID'S week prior day of surgery  Advice ASC call  Verbalized understanding  Acetaminophen Med Class   Continue to take this medication on your normal schedule  If this is an oral medication and you take it in the morning, then you may take this medicine with a sip of water  Herbal Med Class   Stop taking this herbal medications at least one week prior to surgery/procedure  NSAID Med Class   Stop taking this medication at least 3 days prior to surgery/procedure  Thyroxine Med Class   Continue to take this medication on your normal schedule  If this is an oral medication and you take it in the morning, then you may take this medicine with a sip of water  Vitamin Med Class   You may continue to take any vitamin that your surgeon has prescribed to you up to the day before surgery   If your surgeon has not specifically prescribed this vitamin or instructed you to continue then stop taking 7 days prior to surgery

## 2020-07-11 LAB
BACTERIA UR CULT: NORMAL
INPATIENT: NORMAL
SARS-COV-2 RNA SPEC QL NAA+PROBE: NOT DETECTED

## 2020-07-16 ENCOUNTER — ANESTHESIA EVENT (OUTPATIENT)
Dept: PERIOP | Facility: HOSPITAL | Age: 45
End: 2020-07-16
Payer: COMMERCIAL

## 2020-07-17 ENCOUNTER — ANESTHESIA (OUTPATIENT)
Dept: PERIOP | Facility: HOSPITAL | Age: 45
End: 2020-07-17
Payer: COMMERCIAL

## 2020-07-17 ENCOUNTER — TELEPHONE (OUTPATIENT)
Dept: UROLOGY | Facility: MEDICAL CENTER | Age: 45
End: 2020-07-17

## 2020-07-17 ENCOUNTER — HOSPITAL ENCOUNTER (OUTPATIENT)
Facility: HOSPITAL | Age: 45
Setting detail: OUTPATIENT SURGERY
Discharge: HOME | End: 2020-07-17
Attending: UROLOGY | Admitting: UROLOGY
Payer: COMMERCIAL

## 2020-07-17 VITALS
HEIGHT: 70 IN | OXYGEN SATURATION: 99 % | TEMPERATURE: 96 F | RESPIRATION RATE: 16 BRPM | WEIGHT: 202 LBS | SYSTOLIC BLOOD PRESSURE: 119 MMHG | BODY MASS INDEX: 28.92 KG/M2 | HEART RATE: 71 BPM | DIASTOLIC BLOOD PRESSURE: 81 MMHG

## 2020-07-17 DIAGNOSIS — N20.0 RENAL CALCULUS, RIGHT: Primary | ICD-10-CM

## 2020-07-17 PROCEDURE — 50590 FRAGMENTING OF KIDNEY STONE: CPT | Performed by: UROLOGY

## 2020-07-17 PROCEDURE — NC001 PR NO CHARGE: Performed by: UROLOGY

## 2020-07-17 RX ORDER — KETOROLAC TROMETHAMINE 30 MG/ML
15 INJECTION, SOLUTION INTRAMUSCULAR; INTRAVENOUS EVERY 6 HOURS SCHEDULED
Status: DISCONTINUED | OUTPATIENT
Start: 2020-07-17 | End: 2020-07-17 | Stop reason: HOSPADM

## 2020-07-17 RX ORDER — OXYCODONE HYDROCHLORIDE 5 MG/1
TABLET ORAL
Qty: 6 TABLET | Refills: 0 | Status: SHIPPED | OUTPATIENT
Start: 2020-07-17 | End: 2020-08-24 | Stop reason: ALTCHOICE

## 2020-07-17 RX ORDER — LIDOCAINE HYDROCHLORIDE 10 MG/ML
INJECTION, SOLUTION EPIDURAL; INFILTRATION; INTRACAUDAL; PERINEURAL AS NEEDED
Status: DISCONTINUED | OUTPATIENT
Start: 2020-07-17 | End: 2020-07-17 | Stop reason: SURG

## 2020-07-17 RX ORDER — OXYCODONE HYDROCHLORIDE 5 MG/1
5 TABLET ORAL EVERY 4 HOURS PRN
Status: DISCONTINUED | OUTPATIENT
Start: 2020-07-17 | End: 2020-07-17 | Stop reason: HOSPADM

## 2020-07-17 RX ORDER — FENTANYL CITRATE/PF 50 MCG/ML
25 SYRINGE (ML) INJECTION
Status: DISCONTINUED | OUTPATIENT
Start: 2020-07-17 | End: 2020-07-17 | Stop reason: HOSPADM

## 2020-07-17 RX ORDER — ACETAMINOPHEN 325 MG/1
650 TABLET ORAL EVERY 6 HOURS PRN
Status: DISCONTINUED | OUTPATIENT
Start: 2020-07-17 | End: 2020-07-17 | Stop reason: HOSPADM

## 2020-07-17 RX ORDER — ONDANSETRON 2 MG/ML
4 INJECTION INTRAMUSCULAR; INTRAVENOUS EVERY 6 HOURS PRN
Status: DISCONTINUED | OUTPATIENT
Start: 2020-07-17 | End: 2020-07-17 | Stop reason: HOSPADM

## 2020-07-17 RX ORDER — MIDAZOLAM HYDROCHLORIDE 2 MG/2ML
INJECTION, SOLUTION INTRAMUSCULAR; INTRAVENOUS AS NEEDED
Status: DISCONTINUED | OUTPATIENT
Start: 2020-07-17 | End: 2020-07-17 | Stop reason: SURG

## 2020-07-17 RX ORDER — DEXAMETHASONE SODIUM PHOSPHATE 10 MG/ML
INJECTION, SOLUTION INTRAMUSCULAR; INTRAVENOUS AS NEEDED
Status: DISCONTINUED | OUTPATIENT
Start: 2020-07-17 | End: 2020-07-17 | Stop reason: SURG

## 2020-07-17 RX ORDER — PROPOFOL 10 MG/ML
INJECTION, EMULSION INTRAVENOUS AS NEEDED
Status: DISCONTINUED | OUTPATIENT
Start: 2020-07-17 | End: 2020-07-17 | Stop reason: SURG

## 2020-07-17 RX ORDER — MEPERIDINE HYDROCHLORIDE 25 MG/ML
25 INJECTION INTRAMUSCULAR; INTRAVENOUS; SUBCUTANEOUS ONCE AS NEEDED
Status: DISCONTINUED | OUTPATIENT
Start: 2020-07-17 | End: 2020-07-17 | Stop reason: HOSPADM

## 2020-07-17 RX ORDER — CEFAZOLIN SODIUM 2 G/50ML
2000 SOLUTION INTRAVENOUS ONCE
Status: COMPLETED | OUTPATIENT
Start: 2020-07-17 | End: 2020-07-17

## 2020-07-17 RX ORDER — LIDOCAINE HYDROCHLORIDE 10 MG/ML
0.5 INJECTION, SOLUTION EPIDURAL; INFILTRATION; INTRACAUDAL; PERINEURAL ONCE AS NEEDED
Status: DISCONTINUED | OUTPATIENT
Start: 2020-07-17 | End: 2020-07-17 | Stop reason: HOSPADM

## 2020-07-17 RX ORDER — FENTANYL CITRATE 50 UG/ML
INJECTION, SOLUTION INTRAMUSCULAR; INTRAVENOUS AS NEEDED
Status: DISCONTINUED | OUTPATIENT
Start: 2020-07-17 | End: 2020-07-17 | Stop reason: SURG

## 2020-07-17 RX ORDER — KETOROLAC TROMETHAMINE 30 MG/ML
INJECTION, SOLUTION INTRAMUSCULAR; INTRAVENOUS AS NEEDED
Status: DISCONTINUED | OUTPATIENT
Start: 2020-07-17 | End: 2020-07-17 | Stop reason: SURG

## 2020-07-17 RX ORDER — ONDANSETRON 2 MG/ML
INJECTION INTRAMUSCULAR; INTRAVENOUS AS NEEDED
Status: DISCONTINUED | OUTPATIENT
Start: 2020-07-17 | End: 2020-07-17 | Stop reason: SURG

## 2020-07-17 RX ORDER — SODIUM CHLORIDE, SODIUM LACTATE, POTASSIUM CHLORIDE, CALCIUM CHLORIDE 600; 310; 30; 20 MG/100ML; MG/100ML; MG/100ML; MG/100ML
125 INJECTION, SOLUTION INTRAVENOUS CONTINUOUS
Status: DISCONTINUED | OUTPATIENT
Start: 2020-07-17 | End: 2020-07-17 | Stop reason: HOSPADM

## 2020-07-17 RX ADMIN — KETOROLAC TROMETHAMINE 30 MG: 30 INJECTION, SOLUTION INTRAMUSCULAR at 08:11

## 2020-07-17 RX ADMIN — SODIUM CHLORIDE, SODIUM LACTATE, POTASSIUM CHLORIDE, AND CALCIUM CHLORIDE 125 ML/HR: .6; .31; .03; .02 INJECTION, SOLUTION INTRAVENOUS at 08:40

## 2020-07-17 RX ADMIN — PROPOFOL 50 MG: 10 INJECTION, EMULSION INTRAVENOUS at 07:37

## 2020-07-17 RX ADMIN — PROPOFOL 200 MG: 10 INJECTION, EMULSION INTRAVENOUS at 07:36

## 2020-07-17 RX ADMIN — DEXAMETHASONE SODIUM PHOSPHATE 10 MG: 10 INJECTION, SOLUTION INTRAMUSCULAR; INTRAVENOUS at 07:39

## 2020-07-17 RX ADMIN — SODIUM CHLORIDE, SODIUM LACTATE, POTASSIUM CHLORIDE, AND CALCIUM CHLORIDE: .6; .31; .03; .02 INJECTION, SOLUTION INTRAVENOUS at 07:26

## 2020-07-17 RX ADMIN — MIDAZOLAM 2 MG: 1 INJECTION INTRAMUSCULAR; INTRAVENOUS at 07:31

## 2020-07-17 RX ADMIN — FENTANYL CITRATE 50 MCG: 50 INJECTION, SOLUTION INTRAMUSCULAR; INTRAVENOUS at 07:36

## 2020-07-17 RX ADMIN — ONDANSETRON 4 MG: 2 INJECTION INTRAMUSCULAR; INTRAVENOUS at 07:39

## 2020-07-17 RX ADMIN — LIDOCAINE HYDROCHLORIDE 50 MG: 10 INJECTION, SOLUTION EPIDURAL; INFILTRATION; INTRACAUDAL; PERINEURAL at 07:36

## 2020-07-17 RX ADMIN — CEFAZOLIN SODIUM 2000 MG: 2 SOLUTION INTRAVENOUS at 07:33

## 2020-07-17 NOTE — ANESTHESIA PREPROCEDURE EVALUATION
Review of Systems/Medical History  Patient summary reviewed  Chart reviewed  No history of anesthetic complications     Cardiovascular   Pulmonary  Smoker cigarette smoker  , Tobacco cessation counseling given ,        GI/Hepatic    GERD ,        Kidney stones,        Endo/Other  History of thyroid disease , hypothyroidism,      GYN       Hematology  Negative hematology ROS      Musculoskeletal  Negative musculoskeletal ROS        Neurology  Negative neurology ROS      Psychology   Negative psychology ROS              Physical Exam    Airway    Mallampati score: II  TM Distance: >3 FB  Neck ROM: full     Dental   No notable dental hx     Cardiovascular      Pulmonary      Other Findings        Anesthesia Plan  ASA Score- 2     Anesthesia Type- general with ASA Monitors  Additional Monitors:   Airway Plan: LMA  Plan Factors-  Patient smoked on day of surgery  Induction- intravenous  Postoperative Plan-     Informed Consent- Anesthetic plan and risks discussed with patient  I personally reviewed this patient with the CRNA  Discussed and agreed on the Anesthesia Plan with the LIZZY Douglass

## 2020-07-17 NOTE — DISCHARGE INSTR - AVS FIRST PAGE
Use Tylenol and ibuprofen as your first-line pain medication  Drink plenty of fluids and strain urine

## 2020-07-17 NOTE — DISCHARGE SUMMARY
DISCHARGE SUMMARY     Patient Name: Mary Calero    Patient MRN: [id number]    Admitting Provider: Anne-Marie Degroot MD    Discharging Provider: Anne-Marie Degroot MD    Primary Care Physician at Discharge: Brendon Bose, [address]     Admission Date: 7/17/2020     Discharge Date: 7/17/2020    Admission Diagnosis   Renal calculus, right [N20 0]    Discharge Diagnoses  Active Problems:    * No active hospital problems  *      Medications  Current Discharge Medication List      START taking these medications    Details   oxyCODONE (Roxicodone) 5 mg immediate release tablet Take 1-2 tablets every 6 hours prn  Qty: 6 tablet, Refills: 0    Associated Diagnoses: Renal calculus, right         [place] drug awareness website was queried and no issues were identified  Current Discharge Medication List      CONTINUE these medications which have NOT CHANGED    Details   cholecalciferol (VITAMIN D3) 1,000 units tablet Take 1,000 Units by mouth daily      Krill Oil 500 MG CAPS Take 1 tablet by mouth daily       levothyroxine 137 mcg tablet Take 137 mcg by mouth daily  Refills: 0      acetaminophen (TYLENOL) 500 mg tablet Take 500 mg by mouth every 6 (six) hours as needed for mild pain or fever      ibuprofen (ADVIL) 200 mg tablet Take 200 mg by mouth every 4 (four) hours as needed              Allergies  No Known Allergies    Outpatient Follow-Up  Anne-Marie Degroot MD  [facility] [address]  [phone]    Follow up  If you do not already have an appointment the office will call to arrange a follow-up office visit  Please have an x-ray done just prior to the visit  ? Discharge Disposition  Home    Operative Procedures Performed  Procedure(s):  LITHOTRIPSY EXTRACORPORAL SHOCKWAVE (ESWL)  ? Physical Exam at Discharge  Condition of Patient on Discharge: stable  ?   Anne-Marie Degroot MD

## 2020-07-17 NOTE — ANESTHESIA POSTPROCEDURE EVALUATION
Post-Op Assessment Note    CV Status:  Stable  Pain Score: 0    Pain management: adequate     Mental Status:  Alert   Hydration Status:  Stable and euvolemic   PONV Controlled:  None   Airway Patency:  Patent   Post Op Vitals Reviewed: Yes      Staff: CRNA   Comments: vss          BP   113/65   Temp 97 4   Pulse 76   Resp 15   SpO2 97

## 2020-07-17 NOTE — OP NOTE
OPERATIVE REPORT  PATIENT NAME: Nancy Walters    :  1975  MRN: [id number]  Pt Location: [facility] OR ROOM 12    SURGERY DATE: 2020    Surgeon(s) and Role:     * Cathy Montemayor MD - Primary    Preop Diagnosis:  Renal calculus, right [N20 0]    Post-Op Diagnosis Codes:     * Renal calculus, right [N20 0]    Procedure(s) (LRB):  LITHOTRIPSY EXTRACORPORAL SHOCKWAVE (ESWL) (Right)    Specimen(s):  * No specimens in log *    Estimated Blood Loss:   Minimal    Drains:  * No LDAs found *    Anesthesia Type:   General    Operative Indications:  Renal calculus, right [N20 0]-6 mm mid pole      Operative Findings:  Stone was well seen fluoroscopically  The stone was treated with 3000 shocks at a maximum power setting of 7  While the stone did appear to fragment and was smaller at the conclusion the procedure stone fragments were still visible    Complications:   None    Procedure and Technique:  The patient was brought to the procedure room and properly identified  He was placed on lithotripsy table  The stone was visualized fluoroscopically and then general anesthesia was induced  The patient was left on the ESWL table and prepared for the procedure  Intravenous antibiotic was administered  Compression boots were employed  An appropriate time-out was performed  The stone was then visualized fluoroscopically in multiple planes  The stone was placed in the cross hair of the Lithotripter  Extracorporeal shockwave lithotripsy then commenced at a power setting of 1  The power was gradually raised to a maximum of 7  The stone was seen to spread out and then did get smaller  Fragments were still visible at the conclusion the procedure  Went 3000 shocks were administered the procedure was terminated  The patient tolerated procedure well  He was awakened from anesthesia and taken to the recovery room in satisfactory condition    Blood loss was minimal    I was present for the entire procedure    Patient Disposition:  PACU     SIGNATURE: Janae Clement MD  DATE: July 17, 2020  TIME: 8:22 AM

## 2020-07-17 NOTE — INTERVAL H&P NOTE
H&P reviewed  After examining the patient I find no changes in the patients condition since the H&P had been written  Vitals:    07/17/20 0614   BP: 113/68   Pulse: 70   Resp: 18   Temp: (!) 97 2 °F (36 2 °C)   SpO2: 97%   The procedure was reviewed with the patient  Risks, benefits and alternatives were discussed  Informed consent was given and the consent form was signed  Laterality marked - right

## 2020-07-17 NOTE — DISCHARGE INSTRUCTIONS
Lithotripsy   WHAT YOU NEED TO KNOW:   Lithotripsy is a procedure that uses sound waves to break up stones in the kidney, ureter, or bladder  The stone pieces then pass out of your body through your urine  You may have blood in your urine for 1 to 2 days after the procedure  You may also have bruising and discomfort in your back or abdomen  You may have pain whenever you pass pieces of your kidney stone  This may happen over a few weeks  DISCHARGE INSTRUCTIONS:   Call 911 for any of the following:   · You have chest pain  · You have trouble thinking clearly  · You have severe lower back pain  Seek care immediately if:   · You urinate bright red blood  · You have severe vomiting  · You cannot urinate  Contact your healthcare provider if:   · You have a fever and chills  · You feel burning when you urinate  · You feel the urge to urinate often and immediately  · You have questions or concerns about your condition or care  Medicines:   · Medicines  can help decrease pain or prevent an infection  Medicines may also help you pass the stones or prevent more stones from forming  · Take your medicine as directed  Contact your healthcare provider if you think your medicine is not helping or if you have side effects  Tell him or her if you are allergic to any medicine  Keep a list of the medicines, vitamins, and herbs you take  Include the amounts, and when and why you take them  Bring the list or the pill bottles to follow-up visits  Carry your medicine list with you in case of an emergency  Self-care:   · Strain your urine every time you go to the bathroom  Urinate through a strainer or a piece of thin cloth to catch the stones  Take the pieces to your follow-up visits  · Drink liquids as directed  You may need to drink more liquid than usual  This will help flush any remaining small pieces of stone  Liquids can also help prevent more stones from forming   Ask how much liquid to drink each day and which liquids are best for you  Do not drink caffeine  · Rest  when you feel it is needed  Slowly start to do more each day  · Eat a variety of healthy foods  Ask if you need to make changes to the foods you eat  Healthy foods include fruits, vegetables, whole-grain breads, low-fat dairy products, beans, and fish  You may need to limit nuts, chocolate, coffee, and certain green leafy vegetables  You may also need to limit meat and salt  Follow up with your healthcare provider as directed: You may need to return to have your stent removed  Write down your questions so you remember to ask them during your visits  © 2017 [employer] Information is for End User's use only and may not be sold, redistributed or otherwise used for commercial purposes  All illustrations and images included in CareNotes® are the copyrighted property of [employer]  or [employer]  The above information is an  only  It is not intended as medical advice for individual conditions or treatments  Talk to your doctor, nurse or pharmacist before following any medical regimen to see if it is safe and effective for you

## 2020-07-17 NOTE — TELEPHONE ENCOUNTER
Patient had a ESWL today  He is normally followed by Dr Kenton Gardiner  He will need a 1 month follow-up appointment with a KUB with the physician assistant to review the results of the ESWL  Please call to arrange

## 2020-07-20 NOTE — TELEPHONE ENCOUNTER
1st attempt called patient  Left message asking for a call back to schedule 1 month follow up appointment   Office number left in message

## 2020-07-22 NOTE — TELEPHONE ENCOUNTER
2nd attempt called patient and left message for him to call our office back to schedule I month follow up with KUB   Office number left in message

## 2020-08-15 ENCOUNTER — APPOINTMENT (OUTPATIENT)
Dept: RADIOLOGY | Age: 45
End: 2020-08-15
Payer: COMMERCIAL

## 2020-08-15 DIAGNOSIS — N20.0 RENAL CALCULUS, RIGHT: ICD-10-CM

## 2020-08-15 PROCEDURE — 74018 RADEX ABDOMEN 1 VIEW: CPT

## 2020-08-24 ENCOUNTER — TELEPHONE (OUTPATIENT)
Dept: OTHER | Facility: HOSPITAL | Age: 45
End: 2020-08-24

## 2020-08-24 ENCOUNTER — OFFICE VISIT (OUTPATIENT)
Dept: UROLOGY | Facility: AMBULATORY SURGERY CENTER | Age: 45
End: 2020-08-24
Payer: COMMERCIAL

## 2020-08-24 VITALS
HEART RATE: 74 BPM | SYSTOLIC BLOOD PRESSURE: 124 MMHG | DIASTOLIC BLOOD PRESSURE: 78 MMHG | HEIGHT: 70 IN | BODY MASS INDEX: 29.35 KG/M2 | TEMPERATURE: 97.8 F | WEIGHT: 205 LBS

## 2020-08-24 DIAGNOSIS — N20.0 NEPHROLITHIASIS: Primary | ICD-10-CM

## 2020-08-24 LAB
SL AMB  POCT GLUCOSE, UA: ABNORMAL
SL AMB LEUKOCYTE ESTERASE,UA: ABNORMAL
SL AMB POCT BILIRUBIN,UA: ABNORMAL
SL AMB POCT BLOOD,UA: 1
SL AMB POCT CLARITY,UA: CLEAR
SL AMB POCT COLOR,UA: YELLOW
SL AMB POCT KETONES,UA: ABNORMAL
SL AMB POCT NITRITE,UA: ABNORMAL
SL AMB POCT PH,UA: 5
SL AMB POCT SPECIFIC GRAVITY,UA: 1.02
SL AMB POCT URINE PROTEIN: ABNORMAL
SL AMB POCT UROBILINOGEN: ABNORMAL

## 2020-08-24 PROCEDURE — 81002 URINALYSIS NONAUTO W/O SCOPE: CPT | Performed by: NURSE PRACTITIONER

## 2020-08-24 PROCEDURE — 99024 POSTOP FOLLOW-UP VISIT: CPT | Performed by: NURSE PRACTITIONER

## 2020-08-24 NOTE — PROGRESS NOTES
8/24/2020    Denis Huggins  1975  [id number]      Assessment  -Nephrolithiasis s/p right ESWL (7/17/2020)    Discussion/Plan  Doris Arango is a 38 y o  male being managed by Dr Santhosh Jones  1  Nephrolithiasis s/p right ESWL (7/17/2020)- we reviewed the results of his recent KUB which shows presence of 5mm and 1mm right lower pole renal calculus  Although patient is asymptomatic, he wishes to have stones surgically removed  Patient is willing to proceed with ureteroscopy versus ESWL  Will discuss with Dr Marisol Valenzuela and follow up with patient  Informed consent for ureteroscopy was provided including risks and benefits  He verbalized understanding    -All questions answered, patient agrees with plan      History of Present Illness  38 y o  male with a history of nephrolithiasis presents today for follow up  He is previously known to Dr Roberta Garcia  Patient underwent right sided ureteroscopy on 7/17/2020 by Dr Marisol Valenzuela  He reports passing tiny fragments shortly after procedure  Patient asymptomatic at this time, and denies any flank pain, lower urinary tract symptoms, gross hematuria, or dysuria  He has prior history of ureteroscopy performed several years ago  Review of Systems  Review of Systems   Constitutional: Negative  HENT: Negative  Respiratory: Negative  Cardiovascular: Negative  Gastrointestinal: Negative  Genitourinary: Negative for decreased urine volume, difficulty urinating, dysuria, flank pain, frequency, hematuria and urgency  Musculoskeletal: Negative  Skin: Negative  Neurological: Negative  Psychiatric/Behavioral: Negative          Past Medical History  Past Medical History:   Diagnosis Date    Disease of thyroid gland     GERD (gastroesophageal reflux disease)     Kidney stone        Past Social History  Past Surgical History:   Procedure Laterality Date    COLONOSCOPY      KIDNEY STONE SURGERY  2015    AL FRAGMENT KIDNEY STONE/ ESWL Right 7/17/2020 Procedure: LITHOTRIPSY EXTRACORPORAL SHOCKWAVE (ESWL);   Surgeon: Lupe Castillo MD;  [facility];  Service: Urology       Past Family History  Family History   Problem Relation Age of Onset    No Known Problems Mother     Cancer Father        Past Social history  Social History     Socioeconomic History    Marital status: Unknown     Spouse name: Not on file    Number of children: Not on file    Years of education: Not on file    Highest education level: Not on file   Occupational History    Not on file   Social Needs    Financial resource strain: Not on file    Food insecurity     Worry: Not on file     Inability: Not on file    Transportation needs     Medical: Not on file     Non-medical: Not on file   Tobacco Use    Smoking status: Current Every Day Smoker     Packs/day: 1 00     Types: Cigarettes    Smokeless tobacco: Never Used   Substance and Sexual Activity    Alcohol use: Yes     Frequency: Monthly or less     Binge frequency: Never     Comment: OCCASSIONAL    Drug use: No    Sexual activity: Yes     Partners: Female   Lifestyle    Physical activity     Days per week: Not on file     Minutes per session: Not on file    Stress: Not on file   Relationships    Social connections     Talks on phone: Not on file     Gets together: Not on file     Attends Quaker service: Not on file     Active member of club or organization: Not on file     Attends meetings of clubs or organizations: Not on file     Relationship status: Not on file    Intimate partner violence     Fear of current or ex partner: Not on file     Emotionally abused: Not on file     Physically abused: Not on file     Forced sexual activity: Not on file   Other Topics Concern    Not on file   Social History Narrative    Not on file       Current Medications  Current Outpatient Medications   Medication Sig Dispense Refill    acetaminophen (TYLENOL) 500 mg tablet Take 500 mg by mouth every 6 (six) hours as needed for mild pain or fever      cholecalciferol (VITAMIN D3) 1,000 units tablet Take 1,000 Units by mouth daily      ibuprofen (ADVIL) 200 mg tablet Take 200 mg by mouth every 4 (four) hours as needed       Krill Oil 500 MG CAPS Take 1 tablet by mouth daily       levothyroxine 137 mcg tablet Take 137 mcg by mouth daily  0     No current facility-administered medications for this visit  Allergies  No Known Allergies    Past Medical History, Social History, Family History, medications and allergies were reviewed  Vitals  Vitals:    08/24/20 1507   BP: 124/78   BP Location: Right arm   Patient Position: Sitting   Cuff Size: Adult   Pulse: 74   Temp: 97 8 °F (36 6 °C)   TempSrc: Temporal   Weight: 93 kg (205 lb)   Height: 5' 10" (1 778 m)       Physical Exam  Physical Exam  Constitutional:       Appearance: Normal appearance  He is well-developed  HENT:      Head: Normocephalic  Eyes:      Pupils: Pupils are equal, round, and reactive to light  Neck:      Musculoskeletal: Normal range of motion  Pulmonary:      Effort: Pulmonary effort is normal    Abdominal:      Palpations: Abdomen is soft  Genitourinary:     Comments: Negative CVA tenderness  Musculoskeletal: Normal range of motion  Skin:     General: Skin is warm and dry  Neurological:      General: No focal deficit present  Mental Status: He is alert and oriented to person, place, and time  Psychiatric:         Mood and Affect: Mood normal          Behavior: Behavior normal          Thought Content:  Thought content normal          Judgment: Judgment normal          Results    I have personally reviewed all pertinent lab results and reviewed with patient  Lab Results   Component Value Date    PSA 0 3 05/31/2016    PSA 0 3 05/13/2014     Lab Results   Component Value Date    GLUCOSE 91 08/14/2015    CALCIUM 9 0 07/02/2020     08/14/2015    K 4 1 07/02/2020    CO2 25 07/02/2020     07/02/2020    BUN 11 07/02/2020    CREATININE 0 79 07/02/2020     Lab Results   Component Value Date    WBC 4 38 07/02/2020    HGB 13 8 07/02/2020    HCT 42 3 07/02/2020    MCV 88 07/02/2020     07/02/2020     No results found for this or any previous visit (from the past 1 hour(s))

## 2020-09-30 ENCOUNTER — TELEPHONE (OUTPATIENT)
Dept: UROLOGY | Facility: HOSPITAL | Age: 45
End: 2020-09-30

## 2020-09-30 NOTE — TELEPHONE ENCOUNTER
Patient seen in office 1 month ago  At that time, patient questioned surgical intervention for nonobstructing stones  Per [facility] to schedule patient for ureteroscopy  Please call patient to see if he would still like to proceed with surgery or monitor, as he was asymptomatic

## 2020-09-30 NOTE — TELEPHONE ENCOUNTER
Call placed to patient, advised per provider that it would be acceptable to schedule him for ureteroscopy if he is still interested  Patient states that the continues to be asymptomatic, however he wishes to schedule as he "does not want this hanging over his head"  Advised will let provider know to order surgery and office staff will contact him to coordinate  Patient verbalized understanding and agrees with plan

## 2020-09-30 NOTE — TELEPHONE ENCOUNTER
----- Message from Miya Sheets MD sent at 9/29/2020  9:10 PM EDT -----  Sorry- just saw this  If patient would like to proceed then it is OK to proceed  ----- Message -----  From: KATERINA Lin  Sent: 8/24/2020   9:12 PM EDT  To: Miya Sheets MD    Patient s/p right ESWL by you in June, was seen today in f/u  KUB still shows presence of right lower pole calculus measuring 5mm  He is insistent on surgery, but asymptomatic  OK to schedule URS?  Thx

## 2020-09-30 NOTE — TELEPHONE ENCOUNTER
----- Message from Cheo Mejía MD sent at 9/29/2020  9:10 PM EDT -----  Sorry- just saw this  If patient would like to proceed then it is OK to proceed  ----- Message -----  From: KATERINA Burt  Sent: 8/24/2020   9:12 PM EDT  To: Cheo Mejía MD    Patient s/p right ESWL by you in June, was seen today in f/u  KUB still shows presence of right lower pole calculus measuring 5mm  He is insistent on surgery, but asymptomatic  OK to schedule URS?  Thx

## 2020-10-01 ENCOUNTER — PREP FOR PROCEDURE (OUTPATIENT)
Dept: UROLOGY | Facility: AMBULATORY SURGERY CENTER | Age: 45
End: 2020-10-01

## 2020-10-01 DIAGNOSIS — N20.0 NEPHROLITHIASIS: Primary | ICD-10-CM

## 2020-10-01 RX ORDER — CEFAZOLIN SODIUM 2 G/50ML
2000 SOLUTION INTRAVENOUS ONCE
Status: CANCELLED | OUTPATIENT
Start: 2020-10-29 | End: 2020-10-01

## 2020-10-01 NOTE — TELEPHONE ENCOUNTER
Called patient to discuss surgical procedure  He remains asymptomatic, but wishes to proceed with ureteroscopy as previous ESWL was unsuccessful  Informed consent was provided, and discussed post procedure expectations  All questions answered, patient verbalized understanding  Case requested in Epic

## 2020-10-01 NOTE — TELEPHONE ENCOUNTER
Kevin placed order for Right URS but stated Dr Mike Franco should review case and advise if imaging is required prior to surgery  Please see her note below  Please advise and Patient will be scheduled  Thank you!

## 2020-10-05 ENCOUNTER — TELEPHONE (OUTPATIENT)
Dept: UROLOGY | Facility: AMBULATORY SURGERY CENTER | Age: 45
End: 2020-10-05

## 2020-10-05 NOTE — TELEPHONE ENCOUNTER
Thank you  I will inform Patient no further imaging is required and schedule Right Ureteroscopy with [facility] prior

## 2020-10-11 ENCOUNTER — LAB (OUTPATIENT)
Dept: LAB | Facility: CLINIC | Age: 45
End: 2020-10-11
Payer: COMMERCIAL

## 2020-10-11 DIAGNOSIS — N20.0 NEPHROLITHIASIS: ICD-10-CM

## 2020-10-11 PROCEDURE — 87086 URINE CULTURE/COLONY COUNT: CPT

## 2020-10-12 LAB — BACTERIA UR CULT: NORMAL

## 2020-10-26 PROCEDURE — NC001 PR NO CHARGE: Performed by: UROLOGY

## 2020-10-28 ENCOUNTER — ANESTHESIA EVENT (OUTPATIENT)
Dept: PERIOP | Facility: HOSPITAL | Age: 45
End: 2020-10-28
Payer: COMMERCIAL

## 2020-10-29 ENCOUNTER — HOSPITAL ENCOUNTER (OUTPATIENT)
Facility: HOSPITAL | Age: 45
Setting detail: OUTPATIENT SURGERY
Discharge: HOME | End: 2020-10-29
Attending: UROLOGY | Admitting: UROLOGY
Payer: COMMERCIAL

## 2020-10-29 ENCOUNTER — ANESTHESIA (OUTPATIENT)
Dept: PERIOP | Facility: HOSPITAL | Age: 45
End: 2020-10-29
Payer: COMMERCIAL

## 2020-10-29 ENCOUNTER — APPOINTMENT (OUTPATIENT)
Dept: RADIOLOGY | Facility: HOSPITAL | Age: 45
End: 2020-10-29
Payer: COMMERCIAL

## 2020-10-29 ENCOUNTER — TELEPHONE (OUTPATIENT)
Dept: UROLOGY | Facility: MEDICAL CENTER | Age: 45
End: 2020-10-29

## 2020-10-29 VITALS
TEMPERATURE: 98.7 F | WEIGHT: 205 LBS | SYSTOLIC BLOOD PRESSURE: 121 MMHG | HEART RATE: 77 BPM | RESPIRATION RATE: 18 BRPM | DIASTOLIC BLOOD PRESSURE: 77 MMHG | HEIGHT: 70 IN | BODY MASS INDEX: 29.35 KG/M2 | OXYGEN SATURATION: 97 %

## 2020-10-29 VITALS — HEART RATE: 81 BPM

## 2020-10-29 DIAGNOSIS — N20.0 NEPHROLITHIASIS: ICD-10-CM

## 2020-10-29 DIAGNOSIS — N20.0 RENAL CALCULUS, RIGHT: Primary | ICD-10-CM

## 2020-10-29 PROBLEM — IMO0001 SMOKING: Chronic | Status: ACTIVE | Noted: 2020-10-29

## 2020-10-29 PROBLEM — F17.200 SMOKING: Chronic | Status: ACTIVE | Noted: 2020-10-29

## 2020-10-29 PROBLEM — K21.9 GASTROESOPHAGEAL REFLUX DISEASE: Chronic | Status: ACTIVE | Noted: 2020-10-29

## 2020-10-29 PROCEDURE — 74420 UROGRAPHY RTRGR +-KUB: CPT

## 2020-10-29 PROCEDURE — C1769 GUIDE WIRE: HCPCS | Performed by: UROLOGY

## 2020-10-29 PROCEDURE — 52356 CYSTO/URETERO W/LITHOTRIPSY: CPT | Performed by: UROLOGY

## 2020-10-29 PROCEDURE — C2617 STENT, NON-COR, TEM W/O DEL: HCPCS | Performed by: UROLOGY

## 2020-10-29 PROCEDURE — 82360 CALCULUS ASSAY QUANT: CPT | Performed by: UROLOGY

## 2020-10-29 PROCEDURE — NC001 PR NO CHARGE: Performed by: UROLOGY

## 2020-10-29 DEVICE — VARIABLE LENGTH INJECTION STENT SET
Type: IMPLANTABLE DEVICE | Site: URETER | Status: FUNCTIONAL
Brand: CONTOUR VL™ INJECTION STENT SET

## 2020-10-29 RX ORDER — FENTANYL CITRATE 50 UG/ML
INJECTION, SOLUTION INTRAMUSCULAR; INTRAVENOUS AS NEEDED
Status: DISCONTINUED | OUTPATIENT
Start: 2020-10-29 | End: 2020-10-29

## 2020-10-29 RX ORDER — TAMSULOSIN HYDROCHLORIDE 0.4 MG/1
0.4 CAPSULE ORAL
Status: DISCONTINUED | OUTPATIENT
Start: 2020-10-29 | End: 2020-10-29 | Stop reason: HOSPADM

## 2020-10-29 RX ORDER — ONDANSETRON 2 MG/ML
INJECTION INTRAMUSCULAR; INTRAVENOUS AS NEEDED
Status: DISCONTINUED | OUTPATIENT
Start: 2020-10-29 | End: 2020-10-29

## 2020-10-29 RX ORDER — ONDANSETRON 2 MG/ML
4 INJECTION INTRAMUSCULAR; INTRAVENOUS EVERY 6 HOURS PRN
Status: DISCONTINUED | OUTPATIENT
Start: 2020-10-29 | End: 2020-10-29 | Stop reason: HOSPADM

## 2020-10-29 RX ORDER — CEFAZOLIN SODIUM 2 G/50ML
2000 SOLUTION INTRAVENOUS ONCE
Status: COMPLETED | OUTPATIENT
Start: 2020-10-29 | End: 2020-10-29

## 2020-10-29 RX ORDER — KETOROLAC TROMETHAMINE 30 MG/ML
15 INJECTION, SOLUTION INTRAMUSCULAR; INTRAVENOUS EVERY 6 HOURS SCHEDULED
Status: DISCONTINUED | OUTPATIENT
Start: 2020-10-29 | End: 2020-10-29 | Stop reason: HOSPADM

## 2020-10-29 RX ORDER — MIDAZOLAM HYDROCHLORIDE 2 MG/2ML
INJECTION, SOLUTION INTRAMUSCULAR; INTRAVENOUS AS NEEDED
Status: DISCONTINUED | OUTPATIENT
Start: 2020-10-29 | End: 2020-10-29

## 2020-10-29 RX ORDER — MAGNESIUM HYDROXIDE 1200 MG/15ML
LIQUID ORAL AS NEEDED
Status: DISCONTINUED | OUTPATIENT
Start: 2020-10-29 | End: 2020-10-29 | Stop reason: HOSPADM

## 2020-10-29 RX ORDER — OXYCODONE HYDROCHLORIDE 5 MG/1
5 TABLET ORAL EVERY 4 HOURS PRN
Status: DISCONTINUED | OUTPATIENT
Start: 2020-10-29 | End: 2020-10-29 | Stop reason: HOSPADM

## 2020-10-29 RX ORDER — ALBUTEROL SULFATE 2.5 MG/3ML
2.5 SOLUTION RESPIRATORY (INHALATION) ONCE AS NEEDED
Status: DISCONTINUED | OUTPATIENT
Start: 2020-10-29 | End: 2020-10-29 | Stop reason: HOSPADM

## 2020-10-29 RX ORDER — PROPOFOL 10 MG/ML
INJECTION, EMULSION INTRAVENOUS AS NEEDED
Status: DISCONTINUED | OUTPATIENT
Start: 2020-10-29 | End: 2020-10-29

## 2020-10-29 RX ORDER — FENTANYL CITRATE/PF 50 MCG/ML
25 SYRINGE (ML) INJECTION
Status: DISCONTINUED | OUTPATIENT
Start: 2020-10-29 | End: 2020-10-29 | Stop reason: HOSPADM

## 2020-10-29 RX ORDER — SODIUM CHLORIDE 9 MG/ML
INJECTION, SOLUTION INTRAVENOUS AS NEEDED
Status: DISCONTINUED | OUTPATIENT
Start: 2020-10-29 | End: 2020-10-29 | Stop reason: HOSPADM

## 2020-10-29 RX ORDER — ACETAMINOPHEN 325 MG/1
650 TABLET ORAL EVERY 6 HOURS PRN
Status: DISCONTINUED | OUTPATIENT
Start: 2020-10-29 | End: 2020-10-29 | Stop reason: HOSPADM

## 2020-10-29 RX ORDER — TAMSULOSIN HYDROCHLORIDE 0.4 MG/1
0.4 CAPSULE ORAL
Qty: 30 CAPSULE | Refills: 0 | Status: SHIPPED | OUTPATIENT
Start: 2020-10-29 | End: 2021-07-12

## 2020-10-29 RX ORDER — SODIUM CHLORIDE 9 MG/ML
125 INJECTION, SOLUTION INTRAVENOUS CONTINUOUS
Status: DISCONTINUED | OUTPATIENT
Start: 2020-10-29 | End: 2020-10-29 | Stop reason: HOSPADM

## 2020-10-29 RX ORDER — OXYCODONE HYDROCHLORIDE 5 MG/1
TABLET ORAL
Qty: 10 TABLET | Refills: 0 | Status: SHIPPED | OUTPATIENT
Start: 2020-10-29 | End: 2021-07-12

## 2020-10-29 RX ORDER — DEXAMETHASONE SODIUM PHOSPHATE 4 MG/ML
INJECTION, SOLUTION INTRA-ARTICULAR; INTRALESIONAL; INTRAMUSCULAR; INTRAVENOUS; SOFT TISSUE AS NEEDED
Status: DISCONTINUED | OUTPATIENT
Start: 2020-10-29 | End: 2020-10-29

## 2020-10-29 RX ADMIN — DEXAMETHASONE SODIUM PHOSPHATE 4 MG: 4 INJECTION, SOLUTION INTRAMUSCULAR; INTRAVENOUS at 12:14

## 2020-10-29 RX ADMIN — ONDANSETRON 4 MG: 2 INJECTION INTRAMUSCULAR; INTRAVENOUS at 13:44

## 2020-10-29 RX ADMIN — ACETAMINOPHEN 650 MG: 325 TABLET ORAL at 15:15

## 2020-10-29 RX ADMIN — FENTANYL CITRATE 50 MCG: 50 INJECTION, SOLUTION INTRAMUSCULAR; INTRAVENOUS at 12:55

## 2020-10-29 RX ADMIN — CEFAZOLIN SODIUM 2000 MG: 2 SOLUTION INTRAVENOUS at 12:05

## 2020-10-29 RX ADMIN — FENTANYL CITRATE 25 MCG: 50 INJECTION, SOLUTION INTRAMUSCULAR; INTRAVENOUS at 13:38

## 2020-10-29 RX ADMIN — PROPOFOL 300 MG: 10 INJECTION, EMULSION INTRAVENOUS at 12:14

## 2020-10-29 RX ADMIN — SODIUM CHLORIDE: 0.9 INJECTION, SOLUTION INTRAVENOUS at 13:45

## 2020-10-29 RX ADMIN — SODIUM CHLORIDE 125 ML/HR: 0.9 INJECTION, SOLUTION INTRAVENOUS at 09:49

## 2020-10-29 RX ADMIN — IOHEXOL 25 ML: 240 INJECTION, SOLUTION INTRATHECAL; INTRAVASCULAR; INTRAVENOUS; ORAL at 12:45

## 2020-10-29 RX ADMIN — MIDAZOLAM 2 MG: 1 INJECTION INTRAMUSCULAR; INTRAVENOUS at 12:06

## 2020-10-29 RX ADMIN — FENTANYL CITRATE 25 MCG: 50 INJECTION, SOLUTION INTRAMUSCULAR; INTRAVENOUS at 12:20

## 2020-11-03 ENCOUNTER — PROCEDURE VISIT (OUTPATIENT)
Dept: UROLOGY | Facility: AMBULATORY SURGERY CENTER | Age: 45
End: 2020-11-03
Payer: COMMERCIAL

## 2020-11-03 VITALS
DIASTOLIC BLOOD PRESSURE: 78 MMHG | HEART RATE: 64 BPM | SYSTOLIC BLOOD PRESSURE: 140 MMHG | HEIGHT: 70 IN | TEMPERATURE: 97.8 F | WEIGHT: 212.4 LBS | BODY MASS INDEX: 30.41 KG/M2

## 2020-11-03 DIAGNOSIS — N20.0 NEPHROLITHIASIS: Primary | ICD-10-CM

## 2020-11-03 PROCEDURE — 99211 OFF/OP EST MAY X REQ PHY/QHP: CPT | Performed by: UROLOGY

## 2020-11-06 LAB
CALCIUM OXALATE DIHYDRATE MFR STONE IR: 30 %
COLOR STONE: NORMAL
COM MFR STONE: 65 %
COMMENT-STONE3: NORMAL
COMPOSITION: NORMAL
HYDROXYAPATITE 24H ENGDIFF UR: 5 %
LABORATORY COMMENT REPORT: NORMAL
PHOTO: NORMAL
SIZE STONE: NORMAL MM
SPEC SOURCE SUBJ: NORMAL
STONE ANALYSIS-IMP: NORMAL
WT STONE: 12 MG

## 2020-11-20 DIAGNOSIS — N20.0 RENAL CALCULUS, RIGHT: ICD-10-CM

## 2020-11-24 RX ORDER — TAMSULOSIN HYDROCHLORIDE 0.4 MG/1
CAPSULE ORAL
Qty: 30 CAPSULE | Refills: 0 | OUTPATIENT
Start: 2020-11-24

## 2021-01-09 ENCOUNTER — APPOINTMENT (OUTPATIENT)
Dept: RADIOLOGY | Age: 46
End: 2021-01-09
Payer: COMMERCIAL

## 2021-01-09 DIAGNOSIS — N20.0 NEPHROLITHIASIS: ICD-10-CM

## 2021-01-09 PROCEDURE — 74018 RADEX ABDOMEN 1 VIEW: CPT

## 2021-01-12 ENCOUNTER — OFFICE VISIT (OUTPATIENT)
Dept: UROLOGY | Facility: AMBULATORY SURGERY CENTER | Age: 46
End: 2021-01-12
Payer: COMMERCIAL

## 2021-01-12 VITALS
HEIGHT: 70 IN | SYSTOLIC BLOOD PRESSURE: 118 MMHG | WEIGHT: 210 LBS | DIASTOLIC BLOOD PRESSURE: 78 MMHG | BODY MASS INDEX: 30.06 KG/M2 | HEART RATE: 66 BPM

## 2021-01-12 DIAGNOSIS — N20.0 NEPHROLITHIASIS: Primary | ICD-10-CM

## 2021-01-12 PROCEDURE — 99214 OFFICE O/P EST MOD 30 MIN: CPT | Performed by: NURSE PRACTITIONER

## 2021-01-12 NOTE — PATIENT INSTRUCTIONS
Urine testing and blood work to be performed before next office visit  Ultrasound of kidney and bladder before next office visit  Make sure you are drinking at least 40-60 oz of water per day  Add lemon, lime, orange to water  Follow a low salt diet  Call the office for concerns or questions

## 2021-01-12 NOTE — PROGRESS NOTES
1/12/2021      Chief Complaint   Patient presents with    Nephrolithiasis     Assessment and Plan    54 y o  male managed by Dr Darren Nixon    1  Nephrolithiasis  ·  status post cystoscopy, right ureteroscopy with laser lithotripsy, retrograde pyelogram insertion and subsequent removal of right ureteral stent 10/29/2020  ·  stone analysis reveals 95% calcium oxalate  ·  metabolic workup ordered  ·  ultrasound of kidney and bladder to be performed in 6 months  ·  discussed dietary behavior modifications to reduce future stone formation  ·  AUA kidney stone patient education packet provided  ·  follow up in the office in 6 months    History of Present Illness  Edgardo Sommer is a 54 y o  male here for follow up evaluation of nephrolithiasis  Patient is status post cystoscopy, right ureteroscopy with laser lithotripsy, retrograde pyelogram and insertion of right ureteral stent 10/29/2020  Since removal of stent patient denies flank pain and discomfort  Postoperatively he reports doing well with no lower urinary tract symptoms  He denies fever and chills  Review of Systems   Constitutional: Negative for chills and fever  Respiratory: Negative for cough and shortness of breath  Cardiovascular: Negative for chest pain  Gastrointestinal: Negative for abdominal distention, abdominal pain, blood in stool, nausea and vomiting  Genitourinary: Negative for difficulty urinating, dysuria, enuresis, flank pain, frequency, hematuria and urgency  Skin: Negative for rash       Past Medical History  Past Medical History:   Diagnosis Date    Disease of thyroid gland     Gastroesophageal reflux disease 10/29/2020    GERD (gastroesophageal reflux disease)     Kidney stone     R side- for laser/stent today 10/29/2020    Smoking 10/29/2020    Wears contact lenses        Past Social History  Past Surgical History:   Procedure Laterality Date    COLONOSCOPY      FL RETROGRADE PYELOGRAM  10/29/2020    KIDNEY STONE SURGERY  2015    [place] CYSTO/URETERO W/LITHOTRIPSY &INDWELL STENT INSRT Right 10/29/2020    Procedure: CYSTO, URETEROSCOPY W/LASER lithotripsy, RETROGRADE PYELOGRAM, stone basket extraction, insertion double J STENT;  Surgeon: Selina Saleh MD;  [facility];  Service: Urology    WI FRAGMENT KIDNEY STONE/ ESWL Right 7/17/2020    Procedure: LITHOTRIPSY EXTRACORPORAL SHOCKWAVE (ESWL); Surgeon: Selina Saleh MD;  [facility];  Service: Urology     Social History     Tobacco Use   Smoking Status Current Every Day Smoker    Packs/day: 1 00    Years: 28 00    Pack years: 28 00    Types: Cigarettes   Smokeless Tobacco Never Used   Tobacco Comment    pt reports 3/4 of a ppd now       Past Family History  Family History   Problem Relation Age of Onset    No Known Problems Mother     Cancer Father        Past Social history  Social History     Socioeconomic History    Marital status: Unknown     Spouse name: Not on file    Number of children: Not on file    Years of education: Not on file    Highest education level: Not on file   Occupational History    Not on file   Social Needs    Financial resource strain: Not on file    Food insecurity     Worry: Not on file     Inability: Not on file    Transportation needs     Medical: Not on file     Non-medical: Not on file   Tobacco Use    Smoking status: Current Every Day Smoker     Packs/day: 1 00     Years: 28 00     Pack years: 28 00     Types: Cigarettes    Smokeless tobacco: Never Used    Tobacco comment: pt reports 3/4 of a ppd now   Substance and Sexual Activity    Alcohol use:  Yes     Alcohol/week: 2 0 standard drinks     Types: 2 Glasses of wine per week     Frequency: Monthly or less     Drinks per session: 1 or 2     Binge frequency: Never     Comment: 4x per year    Drug use: No    Sexual activity: Yes     Partners: Female   Lifestyle    Physical activity     Days per week: Not on file     Minutes per session: Not on file    Stress: Not on file   Relationships    Social connections     Talks on phone: Not on file     Gets together: Not on file     Attends Congregational service: Not on file     Active member of club or organization: Not on file     Attends meetings of clubs or organizations: Not on file     Relationship status: Not on file    Intimate partner violence     Fear of current or ex partner: Not on file     Emotionally abused: Not on file     Physically abused: Not on file     Forced sexual activity: Not on file   Other Topics Concern    Not on file   Social History Narrative    Not on file       Current Medications  Current Outpatient Medications   Medication Sig Dispense Refill    levothyroxine 137 mcg tablet Take 137 mcg by mouth daily  0    acetaminophen (TYLENOL) 500 mg tablet Take 500 mg by mouth every 6 (six) hours as needed for mild pain or fever      cholecalciferol (VITAMIN D3) 1,000 units tablet Take 1,000 Units by mouth daily      ibuprofen (ADVIL) 200 mg tablet Take 200 mg by mouth every 4 (four) hours as needed       Krill Oil 500 MG CAPS Take 1 tablet by mouth daily       oxyCODONE (Roxicodone) 5 mg immediate release tablet Take 1-2 tablets every 6 hours prn (Patient not taking: Reported on 11/3/2020) 10 tablet 0    tamsulosin (FLOMAX) 0 4 mg Take 1 capsule (0 4 mg total) by mouth daily with dinner (Patient not taking: Reported on 1/12/2021) 30 capsule 0     No current facility-administered medications for this visit  Allergies  No Known Allergies      The following portions of the patient's history were reviewed and updated as appropriate: allergies, current medications, past medical history, past social history, past surgical history and problem list       Vitals  Vitals:    01/12/21 1542   BP: 118/78   Pulse: 66   Weight: 95 3 kg (210 lb)   Height: 5' 10" (1 778 m)           Physical Exam  Physical Exam  Vitals signs reviewed  Constitutional:       General: He is not in acute distress  Appearance: Normal appearance  He is normal weight  HENT:      Head: Normocephalic  Eyes:      Pupils: Pupils are equal, round, and reactive to light  Cardiovascular:      Rate and Rhythm: Normal rate  Pulmonary:      Effort: No respiratory distress  Breath sounds: Normal breath sounds  Skin:     General: Skin is warm and dry  Neurological:      General: No focal deficit present  Mental Status: He is alert and oriented to person, place, and time  Psychiatric:         Mood and Affect: Mood normal          Behavior: Behavior normal          Results  No results found for this or any previous visit (from the past 1 hour(s)) ]  Lab Results   Component Value Date    PSA 0 3 05/31/2016    PSA 0 3 05/13/2014     Lab Results   Component Value Date    GLUCOSE 91 08/14/2015    CALCIUM 9 0 07/02/2020     08/14/2015    K 4 1 07/02/2020    CO2 25 07/02/2020     07/02/2020    BUN 11 07/02/2020    CREATININE 0 79 07/02/2020     Lab Results   Component Value Date    WBC 4 38 07/02/2020    HGB 13 8 07/02/2020    HCT 42 3 07/02/2020    MCV 88 07/02/2020     07/02/2020     Orders  Orders Placed This Encounter   Procedures    US kidney and bladder     Standing Status:   Future     Standing Expiration Date:   1/12/2025     Scheduling Instructions:      "Prep required if being scheduled in conjunction with other studies, refer to those examination's Preps first before scheduling  All patients for US Kidney and Bladder they must drink 24 oz of water 60 minutes before your scheduled appointment time  This test requires you to have a FULL bladder  Please do not urinate before your test             Please bring your physician order, insurance cards, a form of photo ID and a list of your medications with you  Arrive 15 minutes prior to your appointment time in order to register              If you need to have lab work or a urinalysis, please do this AFTER your ultrasound  "     Order Specific Question:   Reason for Exam:     Answer:   nephrolithiasis    Basic metabolic panel     This is a patient instruction: Patient fasting for 8 hours or longer recommended       Standing Status:   Future     Standing Expiration Date:   7/12/2022    Magnesium     Standing Status:   Future     Standing Expiration Date:   1/12/2022    Phosphorus     Standing Status:   Future     Standing Expiration Date:   1/12/2022    PTH, intact     Standing Status:   Future     Standing Expiration Date:   1/12/2022   Migel No risk profile     Standing Status:   Future     Standing Expiration Date:   1/12/2022    Uric acid     Standing Status:   Future     Standing Expiration Date:   1/12/2022       KATERINA Ahn

## 2021-01-23 ENCOUNTER — LAB (OUTPATIENT)
Dept: LAB | Age: 46
End: 2021-01-23
Payer: COMMERCIAL

## 2021-01-23 DIAGNOSIS — N20.0 NEPHROLITHIASIS: ICD-10-CM

## 2021-01-23 LAB
ANION GAP SERPL CALCULATED.3IONS-SCNC: 2 MMOL/L (ref 4–13)
BUN SERPL-MCNC: 15 MG/DL (ref 5–25)
CALCIUM SERPL-MCNC: 9.5 MG/DL (ref 8.3–10.1)
CHLORIDE SERPL-SCNC: 108 MMOL/L (ref 100–108)
CO2 SERPL-SCNC: 28 MMOL/L (ref 21–32)
CREAT SERPL-MCNC: 0.88 MG/DL (ref 0.6–1.3)
GFR SERPL CREATININE-BSD FRML MDRD: 103 ML/MIN/1.73SQ M
GLUCOSE P FAST SERPL-MCNC: 107 MG/DL (ref 65–99)
MAGNESIUM SERPL-MCNC: 2.4 MG/DL (ref 1.6–2.6)
PHOSPHATE SERPL-MCNC: 3.1 MG/DL (ref 2.7–4.5)
POTASSIUM SERPL-SCNC: 4.9 MMOL/L (ref 3.5–5.3)
PTH-INTACT SERPL-MCNC: 24.3 PG/ML (ref 18.4–80.1)
SODIUM SERPL-SCNC: 138 MMOL/L (ref 136–145)
URATE SERPL-MCNC: 4.4 MG/DL (ref 4.2–8)

## 2021-01-23 PROCEDURE — 84100 ASSAY OF PHOSPHORUS: CPT

## 2021-01-23 PROCEDURE — 83970 ASSAY OF PARATHORMONE: CPT

## 2021-01-23 PROCEDURE — 36415 COLL VENOUS BLD VENIPUNCTURE: CPT

## 2021-01-23 PROCEDURE — 80048 BASIC METABOLIC PNL TOTAL CA: CPT

## 2021-01-23 PROCEDURE — 83735 ASSAY OF MAGNESIUM: CPT

## 2021-01-23 PROCEDURE — 84550 ASSAY OF BLOOD/URIC ACID: CPT

## 2021-02-07 ENCOUNTER — LAB (OUTPATIENT)
Dept: LAB | Age: 46
End: 2021-02-07
Payer: COMMERCIAL

## 2021-02-07 ENCOUNTER — TRANSCRIBE ORDERS (OUTPATIENT)
Dept: ADMINISTRATIVE | Age: 46
End: 2021-02-07

## 2021-02-07 DIAGNOSIS — N20.0 URIC ACID NEPHROLITHIASIS: Primary | ICD-10-CM

## 2021-02-07 PROCEDURE — 83935 ASSAY OF URINE OSMOLALITY: CPT | Performed by: NURSE PRACTITIONER

## 2021-02-07 PROCEDURE — 84133 ASSAY OF URINE POTASSIUM: CPT | Performed by: NURSE PRACTITIONER

## 2021-02-07 PROCEDURE — 84105 ASSAY OF URINE PHOSPHORUS: CPT | Performed by: NURSE PRACTITIONER

## 2021-02-07 PROCEDURE — 82340 ASSAY OF CALCIUM IN URINE: CPT | Performed by: NURSE PRACTITIONER

## 2021-02-07 PROCEDURE — 83945 ASSAY OF OXALATE: CPT | Performed by: NURSE PRACTITIONER

## 2021-02-07 PROCEDURE — 82131 AMINO ACIDS SINGLE QUANT: CPT | Performed by: NURSE PRACTITIONER

## 2021-02-07 PROCEDURE — 83735 ASSAY OF MAGNESIUM: CPT | Performed by: NURSE PRACTITIONER

## 2021-02-07 PROCEDURE — 82140 ASSAY OF AMMONIA: CPT | Performed by: NURSE PRACTITIONER

## 2021-02-07 PROCEDURE — 84392 ASSAY OF URINE SULFATE: CPT | Performed by: NURSE PRACTITIONER

## 2021-02-07 PROCEDURE — 84560 ASSAY OF URINE/URIC ACID: CPT | Performed by: NURSE PRACTITIONER

## 2021-02-07 PROCEDURE — 84300 ASSAY OF URINE SODIUM: CPT | Performed by: NURSE PRACTITIONER

## 2021-02-07 PROCEDURE — 82507 ASSAY OF CITRATE: CPT | Performed by: NURSE PRACTITIONER

## 2021-02-07 PROCEDURE — 82570 ASSAY OF URINE CREATININE: CPT | Performed by: NURSE PRACTITIONER

## 2021-02-07 PROCEDURE — 82436 ASSAY OF URINE CHLORIDE: CPT | Performed by: NURSE PRACTITIONER

## 2021-02-07 PROCEDURE — 81003 URINALYSIS AUTO W/O SCOPE: CPT | Performed by: NURSE PRACTITIONER

## 2021-02-14 LAB
AMMONIA 24H UR-MRATE: 26 MEQ/24 HR
AMMONIA UR-SCNC: ABNORMAL UG/DL
CA H2 PHOS DIHYD CRY URNS QL MICRO: 1.82 RATIO (ref 0–3)
CALCIUM 24H UR-MCNC: 26.9 MG/DL
CALCIUM 24H UR-MRATE: 295.9 MG/24 HR (ref 100–300)
CHLORIDE 24H UR-SCNC: 122 MMOL/L
CHLORIDE 24H UR-SRATE: 134 MMOL/24 HR (ref 110–250)
CITRATE 24H UR-MCNC: 338 MG/L
CITRATE 24H UR-MRATE: 372 MG/24 HR (ref 320–1240)
COM CRY STONE QL IR: 8.25 RATIO (ref 0–6)
CREAT 24H UR-MCNC: 124.8 MG/DL
CREAT 24H UR-MRATE: 1372.8 MG/24 HR (ref 1000–2000)
CYSTINE 24H UR-MCNC: 22.32 MG/L
CYSTINE 24H UR-MRATE: 24.55 MG/24 HR (ref 10–100)
MAGNESIUM 24H UR-MRATE: 99 MG/24 HR (ref 12–293)
MAGNESIUM UR-MCNC: 9 MG/DL
NA URATE CRY STONE QL IR: 7.53 RATIO (ref 0–4)
OSMOLALITY UR: 756 MOSMOL/KG (ref 300–900)
OXALATE 24H UR-MRATE: 19 MG/24 HR (ref 7–44)
OXALATE UR-MCNC: 17 MG/L
PH 24H UR: 5.7 [PH]
PHOSPHATE 24H UR-MCNC: 58 MG/DL
PHOSPHATE 24H UR-MRATE: 638 MG/24 HR (ref 400–1300)
PLEASE NOTE (STONE RISK): ABNORMAL
POTASSIUM 24H UR-SCNC: 36.9 MMOL/24 HR (ref 25–125)
POTASSIUM UR-SCNC: 33.5 MMOL/L
PRESERVED URINE: 1100 ML/24 HR (ref 800–1800)
SODIUM 24H UR-SCNC: 145 MMOL/L
SODIUM 24H UR-SRATE: 160 MMOL/24 HR (ref 58–337)
SPECIMEN VOL 24H UR: 1100 ML/24 HR (ref 800–1800)
SULFATE 24H UR-MCNC: 37 MEQ/24 HR (ref 0–30)
SULFATE UR-MCNC: 34 MEQ/L
TRI-PHOS CRY STONE MICRO: 0.01 RATIO (ref 0–1)
URATE 24H UR-MCNC: 60.1 MG/DL
URATE 24H UR-MRATE: 661 MG/24 HR (ref 250–750)
URATE DIHYD CRY STONE QL IR: 4.16 RATIO (ref 0–1.2)

## 2021-04-25 ENCOUNTER — OFFICE VISIT (OUTPATIENT)
Dept: URGENT CARE | Age: 46
End: 2021-04-25
Payer: COMMERCIAL

## 2021-04-25 ENCOUNTER — APPOINTMENT (OUTPATIENT)
Dept: RADIOLOGY | Age: 46
End: 2021-04-25
Attending: PHYSICIAN ASSISTANT
Payer: COMMERCIAL

## 2021-04-25 VITALS
OXYGEN SATURATION: 99 % | TEMPERATURE: 97.5 F | SYSTOLIC BLOOD PRESSURE: 136 MMHG | DIASTOLIC BLOOD PRESSURE: 74 MMHG | BODY MASS INDEX: 29.35 KG/M2 | WEIGHT: 205 LBS | HEIGHT: 70 IN | RESPIRATION RATE: 16 BRPM | HEART RATE: 68 BPM

## 2021-04-25 DIAGNOSIS — M25.521 RIGHT ELBOW PAIN: Primary | ICD-10-CM

## 2021-04-25 DIAGNOSIS — S53.401A ELBOW SPRAIN, RIGHT, INITIAL ENCOUNTER: ICD-10-CM

## 2021-04-25 DIAGNOSIS — R52 PAIN: ICD-10-CM

## 2021-04-25 PROCEDURE — 99213 OFFICE O/P EST LOW 20 MIN: CPT | Performed by: PHYSICIAN ASSISTANT

## 2021-04-25 PROCEDURE — 73080 X-RAY EXAM OF ELBOW: CPT

## 2021-04-25 NOTE — PATIENT INSTRUCTIONS
Gradually resume activities as tolerated  Follow-up with Orthopedics if symptoms persist  [phone]      May use ice or heat as needed  Consider physical therapy if no improvement

## 2021-04-25 NOTE — PROGRESS NOTES
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 1 month ago    pain eased and has then come back    feels at tip of right elbow with any pressure    push or pull         History of Present Illness         Patient here for evaluation of persistent right elbow pain  Patient states about a month ago he was sparring and MMA and felt a pop and pain in his right elbow  Patient states after about a week started feeling little bit better but then the pain has persisted  He states is still there when he uses it  He states that rest is not as bad  He denies any other injury or trauma to the elbow  Review of Systems   Review of Systems   Constitutional: Negative  Musculoskeletal: Positive for arthralgias  Negative for joint swelling and myalgias  Skin: Negative  Neurological: Negative            Current Medications       Current Outpatient Medications:     acetaminophen (TYLENOL) 500 mg tablet, Take 500 mg by mouth every 6 (six) hours as needed for mild pain or fever, Disp: , Rfl:     cholecalciferol (VITAMIN D3) 1,000 units tablet, Take 1,000 Units by mouth daily, Disp: , Rfl:     ibuprofen (ADVIL) 200 mg tablet, Take 200 mg by mouth every 4 (four) hours as needed , Disp: , Rfl:     Krill Oil 500 MG CAPS, Take 1 tablet by mouth daily , Disp: , Rfl:     levothyroxine 137 mcg tablet, Take 137 mcg by mouth daily, Disp: , Rfl: 0    MAGNESIUM PO, Take by mouth, Disp: , Rfl:     oxyCODONE (Roxicodone) 5 mg immediate release tablet, Take 1-2 tablets every 6 hours prn (Patient not taking: Reported on 4/25/2021), Disp: 10 tablet, Rfl: 0    tamsulosin (FLOMAX) 0 4 mg, Take 1 capsule (0 4 mg total) by mouth daily with dinner (Patient not taking: Reported on 4/25/2021), Disp: 30 capsule, Rfl: 0    Current Allergies     Allergies as of 04/25/2021    (No Known Allergies)            The following portions of the patient's history were reviewed and updated as appropriate: allergies, current medications, past family history, past medical history, past social history, past surgical history and problem list      Past Medical History:   Diagnosis Date    Disease of thyroid gland     Gastroesophageal reflux disease 10/29/2020    GERD (gastroesophageal reflux disease)     Kidney stone     R side- for laser/stent today 10/29/2020    Smoking 10/29/2020    Wears contact lenses        Past Surgical History:   Procedure Laterality Date    COLONOSCOPY      FL RETROGRADE PYELOGRAM  10/29/2020    KIDNEY STONE SURGERY  2015    OR CYSTO/URETERO W/LITHOTRIPSY &INDWELL STENT INSRT Right 10/29/2020    Procedure: CYSTO, URETEROSCOPY W/LASER lithotripsy, RETROGRADE PYELOGRAM, stone basket extraction, insertion double J STENT;  Surgeon: Heavenly Peña MD;  Location[facility];  Service: Urology    OR FRAGMENT KIDNEY STONE/ ESWL Right 7/17/2020    Procedure: LITHOTRIPSY EXTRACORPORAL SHOCKWAVE (ESWL); Surgeon: Heavenly Peña MD;  [facility];  Service: Urology       Family History   Problem Relation Age of Onset    No Known Problems Mother     Cancer Father          Medications have been verified  Objective   /74   Pulse 68   Temp 97 5 °F (36 4 °C)   Resp 16   Ht 5' 10" (1 778 m)   Wt 93 kg (205 lb)   SpO2 99%   BMI 29 41 kg/m²   No LMP for male patient  Physical Exam     Physical Exam  Vitals signs and nursing note reviewed  Constitutional:       General: He is not in acute distress  Appearance: Normal appearance  He is well-developed   He is not ill-appearing, toxic-appearing or diaphoretic  HENT:      Head: Normocephalic and atraumatic  Eyes:      Extraocular Movements: Extraocular movements intact  Conjunctiva/sclera: Conjunctivae normal       Pupils: Pupils are equal, round, and reactive to light  Musculoskeletal:      Comments: Full range of motion of the right elbow  No significant tenderness  No obvious deformity other than the right elbow olecranon is slightly more prominent than the left  No erythema  No ecchymosis  No bursitis  No crepitation  Strength 5/5  Skin:     General: Skin is warm and dry  Neurological:      General: No focal deficit present  Mental Status: He is alert and oriented to person, place, and time  Psychiatric:         Mood and Affect: Mood normal          Behavior: Behavior normal          Thought Content: Thought content normal          Judgment: Judgment normal          X-ray shows a questionable old avulsion of the olecranon bone spur as well as at the olecranon process  Given the time frame of at least 4 weeks from the date of injury and no evidence of joint effusion will continue to monitor and have patient follow-up with Orthopedics if symptoms are persisting

## 2021-06-04 ENCOUNTER — TELEPHONE (OUTPATIENT)
Dept: INTERNAL MEDICINE CLINIC | Facility: CLINIC | Age: 46
End: 2021-06-04

## 2021-06-04 DIAGNOSIS — D64.9 ANEMIA, UNSPECIFIED TYPE: ICD-10-CM

## 2021-06-04 DIAGNOSIS — E03.9 PRIMARY HYPOTHYROIDISM: Primary | ICD-10-CM

## 2021-06-04 DIAGNOSIS — Z00.00 PHYSICAL EXAM, ANNUAL: Primary | ICD-10-CM

## 2021-06-04 DIAGNOSIS — E78.2 MIXED HYPERLIPIDEMIA: ICD-10-CM

## 2021-06-04 DIAGNOSIS — I10 ESSENTIAL HYPERTENSION: ICD-10-CM

## 2021-06-04 DIAGNOSIS — R73.9 BLOOD GLUCOSE ELEVATED: ICD-10-CM

## 2021-06-04 NOTE — TELEPHONE ENCOUNTER
I placed yearly exam orders   If you could call him and let him know they are in there that would be great

## 2021-06-04 NOTE — TELEPHONE ENCOUNTER
Patient called this morning looking to see if routine lab work could be placed for yearly physical that is on 06/14, he advised that he usually gets his thyroid level checked  I can notify patient if labs will be placed

## 2021-06-11 ENCOUNTER — APPOINTMENT (OUTPATIENT)
Dept: LAB | Age: 46
End: 2021-06-11
Payer: COMMERCIAL

## 2021-06-11 DIAGNOSIS — Z00.00 PHYSICAL EXAM, ANNUAL: ICD-10-CM

## 2021-06-11 DIAGNOSIS — E03.9 PRIMARY HYPOTHYROIDISM: ICD-10-CM

## 2021-06-11 DIAGNOSIS — E78.2 MIXED HYPERLIPIDEMIA: ICD-10-CM

## 2021-06-11 DIAGNOSIS — R73.9 BLOOD GLUCOSE ELEVATED: ICD-10-CM

## 2021-06-11 DIAGNOSIS — I10 ESSENTIAL HYPERTENSION: ICD-10-CM

## 2021-06-11 LAB
25(OH)D3 SERPL-MCNC: 37.9 NG/ML (ref 30–100)
ALBUMIN SERPL BCP-MCNC: 3.9 G/DL (ref 3.5–5)
ALP SERPL-CCNC: 75 U/L (ref 46–116)
ALT SERPL W P-5'-P-CCNC: 19 U/L (ref 12–78)
ANION GAP SERPL CALCULATED.3IONS-SCNC: 5 MMOL/L (ref 4–13)
AST SERPL W P-5'-P-CCNC: 12 U/L (ref 5–45)
BACTERIA UR QL AUTO: NORMAL /HPF
BASOPHILS # BLD AUTO: 0.04 THOUSANDS/ΜL (ref 0–0.1)
BASOPHILS NFR BLD AUTO: 1 % (ref 0–1)
BILIRUB SERPL-MCNC: 0.5 MG/DL (ref 0.2–1)
BILIRUB UR QL STRIP: NEGATIVE
BUN SERPL-MCNC: 15 MG/DL (ref 5–25)
CALCIUM SERPL-MCNC: 9.2 MG/DL (ref 8.3–10.1)
CHLORIDE SERPL-SCNC: 110 MMOL/L (ref 100–108)
CHOLEST SERPL-MCNC: 198 MG/DL (ref 50–200)
CLARITY UR: CLEAR
CO2 SERPL-SCNC: 24 MMOL/L (ref 21–32)
COLOR UR: YELLOW
CREAT SERPL-MCNC: 0.75 MG/DL (ref 0.6–1.3)
EOSINOPHIL # BLD AUTO: 0.09 THOUSAND/ΜL (ref 0–0.61)
EOSINOPHIL NFR BLD AUTO: 2 % (ref 0–6)
ERYTHROCYTE [DISTWIDTH] IN BLOOD BY AUTOMATED COUNT: 12.6 % (ref 11.6–15.1)
EST. AVERAGE GLUCOSE BLD GHB EST-MCNC: 117 MG/DL
GFR SERPL CREATININE-BSD FRML MDRD: 110 ML/MIN/1.73SQ M
GLUCOSE P FAST SERPL-MCNC: 96 MG/DL (ref 65–99)
GLUCOSE UR STRIP-MCNC: NEGATIVE MG/DL
HBA1C MFR BLD: 5.7 %
HCT VFR BLD AUTO: 43.4 % (ref 36.5–49.3)
HDLC SERPL-MCNC: 36 MG/DL
HGB BLD-MCNC: 14.2 G/DL (ref 12–17)
HGB UR QL STRIP.AUTO: NEGATIVE
HYALINE CASTS #/AREA URNS LPF: NORMAL /LPF
IMM GRANULOCYTES # BLD AUTO: 0.01 THOUSAND/UL (ref 0–0.2)
IMM GRANULOCYTES NFR BLD AUTO: 0 % (ref 0–2)
KETONES UR STRIP-MCNC: NEGATIVE MG/DL
LDLC SERPL CALC-MCNC: 139 MG/DL (ref 0–100)
LEUKOCYTE ESTERASE UR QL STRIP: NEGATIVE
LYMPHOCYTES # BLD AUTO: 0.73 THOUSANDS/ΜL (ref 0.6–4.47)
LYMPHOCYTES NFR BLD AUTO: 17 % (ref 14–44)
MCH RBC QN AUTO: 28.5 PG (ref 26.8–34.3)
MCHC RBC AUTO-ENTMCNC: 32.7 G/DL (ref 31.4–37.4)
MCV RBC AUTO: 87 FL (ref 82–98)
MONOCYTES # BLD AUTO: 0.3 THOUSAND/ΜL (ref 0.17–1.22)
MONOCYTES NFR BLD AUTO: 7 % (ref 4–12)
NEUTROPHILS # BLD AUTO: 3.07 THOUSANDS/ΜL (ref 1.85–7.62)
NEUTS SEG NFR BLD AUTO: 73 % (ref 43–75)
NITRITE UR QL STRIP: NEGATIVE
NON-SQ EPI CELLS URNS QL MICRO: NORMAL /HPF
NRBC BLD AUTO-RTO: 0 /100 WBCS
PH UR STRIP.AUTO: 6 [PH]
PLATELET # BLD AUTO: 206 THOUSANDS/UL (ref 149–390)
PMV BLD AUTO: 10.6 FL (ref 8.9–12.7)
POTASSIUM SERPL-SCNC: 4.4 MMOL/L (ref 3.5–5.3)
PROT SERPL-MCNC: 6.8 G/DL (ref 6.4–8.2)
PROT UR STRIP-MCNC: NEGATIVE MG/DL
RBC # BLD AUTO: 4.98 MILLION/UL (ref 3.88–5.62)
RBC #/AREA URNS AUTO: NORMAL /HPF
SODIUM SERPL-SCNC: 139 MMOL/L (ref 136–145)
SP GR UR STRIP.AUTO: 1.02 (ref 1–1.03)
T4 FREE SERPL-MCNC: 1.13 NG/DL (ref 0.76–1.46)
TRIGL SERPL-MCNC: 114 MG/DL
TSH SERPL DL<=0.05 MIU/L-ACNC: 0.34 UIU/ML (ref 0.36–3.74)
UROBILINOGEN UR QL STRIP.AUTO: 0.2 E.U./DL
WBC # BLD AUTO: 4.24 THOUSAND/UL (ref 4.31–10.16)
WBC #/AREA URNS AUTO: NORMAL /HPF

## 2021-06-11 PROCEDURE — 83036 HEMOGLOBIN GLYCOSYLATED A1C: CPT

## 2021-06-11 PROCEDURE — 84443 ASSAY THYROID STIM HORMONE: CPT

## 2021-06-11 PROCEDURE — 84153 ASSAY OF PSA TOTAL: CPT

## 2021-06-11 PROCEDURE — 85025 COMPLETE CBC W/AUTO DIFF WBC: CPT

## 2021-06-11 PROCEDURE — 84439 ASSAY OF FREE THYROXINE: CPT

## 2021-06-11 PROCEDURE — 36415 COLL VENOUS BLD VENIPUNCTURE: CPT

## 2021-06-11 PROCEDURE — 84154 ASSAY OF PSA FREE: CPT

## 2021-06-11 PROCEDURE — 81001 URINALYSIS AUTO W/SCOPE: CPT | Performed by: NURSE PRACTITIONER

## 2021-06-11 PROCEDURE — 80061 LIPID PANEL: CPT

## 2021-06-11 PROCEDURE — 82306 VITAMIN D 25 HYDROXY: CPT

## 2021-06-11 PROCEDURE — 80053 COMPREHEN METABOLIC PANEL: CPT

## 2021-06-14 ENCOUNTER — OFFICE VISIT (OUTPATIENT)
Dept: INTERNAL MEDICINE CLINIC | Facility: CLINIC | Age: 46
End: 2021-06-14
Payer: COMMERCIAL

## 2021-06-14 VITALS
HEIGHT: 71 IN | HEART RATE: 116 BPM | OXYGEN SATURATION: 98 % | DIASTOLIC BLOOD PRESSURE: 72 MMHG | SYSTOLIC BLOOD PRESSURE: 106 MMHG | BODY MASS INDEX: 28.67 KG/M2 | WEIGHT: 204.8 LBS | TEMPERATURE: 97.3 F

## 2021-06-14 DIAGNOSIS — E78.00 HYPERCHOLESTEREMIA: ICD-10-CM

## 2021-06-14 DIAGNOSIS — N20.0 RENAL CALCULUS, RIGHT: ICD-10-CM

## 2021-06-14 DIAGNOSIS — M25.559 HIP PAIN: ICD-10-CM

## 2021-06-14 DIAGNOSIS — E03.9 PRIMARY HYPOTHYROIDISM: Primary | ICD-10-CM

## 2021-06-14 PROBLEM — K21.9 GASTROESOPHAGEAL REFLUX DISEASE: Chronic | Status: RESOLVED | Noted: 2020-10-29 | Resolved: 2021-06-14

## 2021-06-14 LAB
PSA FREE MFR SERPL: 40 %
PSA FREE SERPL-MCNC: 0.08 NG/ML
PSA SERPL-MCNC: 0.2 NG/ML (ref 0–4)

## 2021-06-14 PROCEDURE — 99214 OFFICE O/P EST MOD 30 MIN: CPT | Performed by: FAMILY MEDICINE

## 2021-06-14 PROCEDURE — 3008F BODY MASS INDEX DOCD: CPT | Performed by: FAMILY MEDICINE

## 2021-06-14 PROCEDURE — 3725F SCREEN DEPRESSION PERFORMED: CPT | Performed by: FAMILY MEDICINE

## 2021-06-14 PROCEDURE — 4004F PT TOBACCO SCREEN RCVD TLK: CPT | Performed by: FAMILY MEDICINE

## 2021-06-14 NOTE — PROGRESS NOTES
Assessment/Plan:    1  Primary hypothyroidism  -     TSH, 3rd generation; Future  -     T4, free; Future    2  Renal calculus, right    3  Hypercholesteremia  -     Lipid panel; Future  -     Comprehensive metabolic panel; Future    4  Hip pain  -     XR hip/pelv 2-3 vws right if performed; Future; Expected date: 06/14/2021  -     XR hip/pelv 2-3 vws left if performed; Future; Expected date: 06/14/2021      BMI Counseling: Body mass index is 28 83 kg/m²  The BMI is above normal  Nutrition recommendations include moderation in carbohydrate intake  Exercise recommendations include exercising 3-5 times per week  No pharmacotherapy was ordered  p r n  Tylenol or for discomfort  Check hip x-rays  If back pain persists call and we can order x-ray for that  Okay to use over-the-counter muscle rubs and topical treatments  No changes to thyroid medication  Lab work reviewed  Return 6 months  Improved cholesterol    There are no Patient Instructions on file for this visit  Return in about 6 months (around 12/14/2021) for Recheck  Subjective:      Patient ID: Michelle Cotto is a 54 y o  male  Chief Complaint   Patient presents with    Follow-up     f/u visit for hypothyroidism, review labs from 6/11/21  Due for BMI f/u plan  HPI      Primary hypothyroidism, on Synthroid 137 mcg  Recently had lab work which shows slightly hyper TSH but normal T4  Not having any symptoms of hypo or hyperthyroidism and compliant with medication  Does not need refills  Hypercholesterolemia, not on medications  Had lab work done in December which showed elevated cholesterol levels however currently they are 20 points better with dietary modifications  He is very active and does martial ice 3 times per week  Bilateral hip pain  Started 1-2 years ago and now is progressive especially when he does things ally ache martial arts 3 times a week and he notices soreness and discomfort for 1 and half day    He does not describe any pain with any other activity like walking sleeping resting and there is no reproducible discomfort  He is not taking any medication for it    The following portions of the patient's history were reviewed and updated as appropriate: allergies, current medications, past family history, past medical history, past social history, past surgical history and problem list     Review of Systems      Constitutional:  Denies fever or chills   Eyes:  Denies double or blurry vision  HENT:  Denies nasal congestion or sore throat   Respiratory:  Denies cough or shortness of breath or wheezing  Cardiovascular:  Denies palpitations or chest pain  GI:  Denies abdominal pain, nausea, or vomiting, no loose stools  Integument:  Denies rash , no open areas  Neurologic:  Denies headache or focal weakness, no dizziness        Current Outpatient Medications   Medication Sig Dispense Refill    acetaminophen (TYLENOL) 500 mg tablet Take 500 mg by mouth every 6 (six) hours as needed for mild pain or fever      cholecalciferol (VITAMIN D3) 1,000 units tablet Take 1,000 Units by mouth daily      ibuprofen (ADVIL) 200 mg tablet Take 200 mg by mouth every 4 (four) hours as needed       Krill Oil 500 MG CAPS Take 1 tablet by mouth daily       levothyroxine 137 mcg tablet Take 137 mcg by mouth daily  0    MAGNESIUM PO Take by mouth daily       oxyCODONE (Roxicodone) 5 mg immediate release tablet Take 1-2 tablets every 6 hours prn (Patient not taking: Reported on 4/25/2021) 10 tablet 0    tamsulosin (FLOMAX) 0 4 mg Take 1 capsule (0 4 mg total) by mouth daily with dinner (Patient not taking: Reported on 4/25/2021) 30 capsule 0     No current facility-administered medications for this visit         Objective:    /72   Pulse (!) 116   Temp (!) 97 3 °F (36 3 °C) (Tympanic)   Ht 5' 10 67" (1 795 m)   Wt 92 9 kg (204 lb 12 8 oz)   SpO2 98%   BMI 28 83 kg/m²        Physical Exam    Constitutional:  Well developed, well nourished, no acute distress, non-toxic appearance   Eyes:  PERRL, conjunctiva normal , non icteric sclera  HENT:  Atraumatic, oropharynx moist  Neck-  supple   Respiratory:  CTA b/l, normal breath sounds, no rales, no wheezing   Cardiovascular:  RRR, no murmurs, no LE edema b/l  GI:  Soft, nondistended, normal bowel sounds x 4, nontender, no organomegaly, no mass, no rebound, no guarding   Neurologic:  no focal deficits noted   Psychiatric:  Speech and behavior appropriate , AAO x 3  Musculoskeletal, no problems with gait  Negative bilateral straight leg test   Unable to reproduce pain but no bony pain to palpation in in greater trochanters or hip area           Genny Certain, DO

## 2021-06-19 ENCOUNTER — APPOINTMENT (OUTPATIENT)
Dept: RADIOLOGY | Age: 46
End: 2021-06-19
Payer: COMMERCIAL

## 2021-06-19 DIAGNOSIS — M25.559 HIP PAIN: ICD-10-CM

## 2021-06-19 PROCEDURE — 73522 X-RAY EXAM HIPS BI 3-4 VIEWS: CPT

## 2021-06-30 ENCOUNTER — TELEPHONE (OUTPATIENT)
Dept: INTERNAL MEDICINE CLINIC | Age: 46
End: 2021-06-30

## 2021-06-30 NOTE — TELEPHONE ENCOUNTER
----- Message from Celine Wheat DO sent at 6/28/2021  2:04 PM EDT -----  Results noted, can you please review with him regarding bilateral osteoarthritis    No restrictions from my standpoint in his activity but he can continue his martial arts as he sees fit

## 2021-07-06 ENCOUNTER — HOSPITAL ENCOUNTER (OUTPATIENT)
Dept: RADIOLOGY | Age: 46
Discharge: HOME | End: 2021-07-06
Payer: COMMERCIAL

## 2021-07-06 DIAGNOSIS — N20.0 NEPHROLITHIASIS: ICD-10-CM

## 2021-07-06 PROCEDURE — 76770 US EXAM ABDO BACK WALL COMP: CPT

## 2021-07-12 ENCOUNTER — OFFICE VISIT (OUTPATIENT)
Dept: UROLOGY | Facility: AMBULATORY SURGERY CENTER | Age: 46
End: 2021-07-12
Payer: COMMERCIAL

## 2021-07-12 VITALS
HEART RATE: 85 BPM | SYSTOLIC BLOOD PRESSURE: 124 MMHG | WEIGHT: 203 LBS | BODY MASS INDEX: 28.42 KG/M2 | DIASTOLIC BLOOD PRESSURE: 70 MMHG | HEIGHT: 71 IN

## 2021-07-12 DIAGNOSIS — N20.0 NEPHROLITHIASIS: Primary | ICD-10-CM

## 2021-07-12 PROCEDURE — 99214 OFFICE O/P EST MOD 30 MIN: CPT | Performed by: NURSE PRACTITIONER

## 2021-07-12 PROCEDURE — 3008F BODY MASS INDEX DOCD: CPT | Performed by: NURSE PRACTITIONER

## 2021-07-12 PROCEDURE — 4004F PT TOBACCO SCREEN RCVD TLK: CPT | Performed by: NURSE PRACTITIONER

## 2021-07-12 NOTE — PROGRESS NOTES
7/12/2021      Chief Complaint   Patient presents with    Nephrolithiasis     Assessment and Plan    54 y o  male managed by Dr Dickens Pat    1  Nephrolithiasis  ·  ultrasound of kidney and bladder performed 07/06/2021 reveals that the 5 mm stone in the lower pole of the right kidney is no longer present  ·  repeat ultrasound of kidney and bladder and KUB in 1 year  ·  discussed dietary behavior modifications to reduce future stone formation  ·  patient education packet for kidney stone prevention provided  ·  repeat stone risk profile in approximately 6 months  ·  ER precautions discussed  ·  follow up in the office in 1 year      History of Present Illness  Mark Foster is a 54 y o  male here for follow up evaluation of    nephrolithiasis  Patient is status post cystoscopy, right ureteroscopy with laser lithotripsy, retrograde pyelogram and insertion of right ureteral stent 10/29/2020  Since removal of stent patient denies flank pain and discomfort  Postoperatively he reports doing well with no lower urinary tract symptoms  He denies fever and chills  Patient continues to do well with no stone recurrence/ re-formation  He denies suprapubic abdominal pain flank pain  He denies changes to his general health since his prior office evaluation  Review of Systems   Constitutional: Negative for chills and fever  Respiratory: Negative for cough and shortness of breath  Cardiovascular: Negative for chest pain  Gastrointestinal: Negative for abdominal distention, abdominal pain, blood in stool, nausea and vomiting  Genitourinary: Negative for difficulty urinating, dysuria, enuresis, flank pain, frequency, hematuria and urgency  Skin: Negative for rash       Past Medical History  Past Medical History:   Diagnosis Date    Disease of thyroid gland     Gastroesophageal reflux disease 10/29/2020    GERD (gastroesophageal reflux disease)     Kidney stone     R side- for laser/stent today 10/29/2020  Smoking 10/29/2020    Wears contact lenses        Past Social History  Past Surgical History:   Procedure Laterality Date    COLONOSCOPY      FL RETROGRADE PYELOGRAM  10/29/2020    KIDNEY STONE SURGERY  2015    IL CYSTO/URETERO W/LITHOTRIPSY &INDWELL STENT INSRT Right 10/29/2020    Procedure: CYSTO, URETEROSCOPY W/LASER lithotripsy, RETROGRADE PYELOGRAM, stone basket extraction, insertion double J STENT;  Surgeon: Jose Luis Calvillo MD;  Location: [facility];  Service: Urology    IL FRAGMENT KIDNEY STONE/ ESWL Right 7/17/2020    Procedure: LITHOTRIPSY EXTRACORPORAL SHOCKWAVE (ESWL); Surgeon: Jose Luis Calvillo MD;  Location:  MAIN OR;  Service: Urology     Social History     Tobacco Use   Smoking Status Current Every Day Smoker    Packs/day: 1 00    Years: 28 00    Pack years: 28 00    Types: Cigarettes   Smokeless Tobacco Never Used   Tobacco Comment    pt reports 3/4 of a ppd now       Past Family History  Family History   Problem Relation Age of Onset    No Known Problems Mother     Cancer Father        Past Social history  Social History     Socioeconomic History    Marital status: Unknown     Spouse name: Not on file    Number of children: Not on file    Years of education: Not on file    Highest education level: Not on file   Occupational History    Not on file   Tobacco Use    Smoking status: Current Every Day Smoker     Packs/day: 1 00     Years: 28 00     Pack years: 28 00     Types: Cigarettes    Smokeless tobacco: Never Used    Tobacco comment: pt reports 3/4 of a ppd now   Vaping Use    Vaping Use: Never used   Substance and Sexual Activity    Alcohol use:  Yes     Alcohol/week: 2 0 standard drinks     Types: 2 Glasses of wine per week     Comment: 4x per year    Drug use: No    Sexual activity: Yes     Partners: Female   Other Topics Concern    Not on file   Social History Narrative    Not on file     Social Determinants of Health     Financial Resource Strain:    [facility] Difficulty of Paying Living Expenses:    Food Insecurity:     Worried About Running Out of Food in the Last Year:     [address] in the Last Year:    Transportation Needs:     Lack of Transportation (Medical):  Lack of Transportation (Non-Medical):    Physical Activity:     Days of Exercise per Week:     Minutes of Exercise per Session:    Stress:     Feeling of Stress :    Social Connections:     Frequency of Communication with Friends and Family:     Frequency of Social Gatherings with Friends and Family:     Attends [employer] Services:     Active Member of Clubs or Organizations:     Attends Club or Organization Meetings:     Marital Status:    Intimate Partner Violence:     Fear of Current or Ex-Partner:     Emotionally Abused:     Physically Abused:     Sexually Abused:        Current Medications  Current Outpatient Medications   Medication Sig Dispense Refill    acetaminophen (TYLENOL) 500 mg tablet Take 500 mg by mouth every 6 (six) hours as needed for mild pain or fever      cholecalciferol (VITAMIN D3) 1,000 units tablet Take 1,000 Units by mouth daily      ibuprofen (ADVIL) 200 mg tablet Take 200 mg by mouth every 4 (four) hours as needed       Krill Oil 500 MG CAPS Take 1 tablet by mouth daily       levothyroxine 137 mcg tablet Take 137 mcg by mouth daily  0    MAGNESIUM PO Take by mouth daily        No current facility-administered medications for this visit  Allergies  No Known Allergies      The following portions of the patient's history were reviewed and updated as appropriate: allergies, current medications, past medical history, past social history, past surgical history and problem list       Vitals  Vitals:    07/12/21 1415   BP: 124/70   Pulse: 85   Weight: 92 1 kg (203 lb)   Height: 5' 10 67" (1 795 m)           Physical Exam  Physical Exam  Vitals reviewed  Constitutional:       General: He is not in acute distress  Appearance: Normal appearance   He is normal weight  Pulmonary:      Effort: No respiratory distress  Breath sounds: Normal breath sounds  Skin:     General: Skin is warm and dry  Neurological:      General: No focal deficit present  Mental Status: He is alert and oriented to person, place, and time  Psychiatric:         Mood and Affect: Mood normal          Behavior: Behavior normal        Results  No results found for this or any previous visit (from the past 1 hour(s)) ]  Lab Results   Component Value Date    PSA 0 2 06/11/2021    PSA 0 3 05/31/2016    PSA 0 3 05/13/2014     Lab Results   Component Value Date    GLUCOSE 91 08/14/2015    CALCIUM 9 2 06/11/2021     08/14/2015    K 4 4 06/11/2021    CO2 24 06/11/2021     (H) 06/11/2021    BUN 15 06/11/2021    CREATININE 0 75 06/11/2021     Lab Results   Component Value Date    WBC 4 24 (L) 06/11/2021    HGB 14 2 06/11/2021    HCT 43 4 06/11/2021    MCV 87 06/11/2021     06/11/2021           Orders  Orders Placed This Encounter   Procedures    US kidney and bladder     Standing Status:   Future     Standing Expiration Date:   7/12/2025     Scheduling Instructions:      "Prep required if being scheduled in conjunction with other studies, refer to those examination's Preps first before scheduling  All patients for US Kidney and Bladder they must drink 24 oz of water 60 minutes before your scheduled appointment time  This test requires you to have a FULL bladder  Please do not urinate before your test             Please bring your physician order, insurance cards, a form of photo ID and a list of your medications with you  Arrive 15 minutes prior to your appointment time in order to register              If you need to have lab work or a urinalysis, please do this AFTER your ultrasound  "     Order Specific Question:   Reason for Exam:     Answer:   nephrolithiasis    XR abdomen 1 view kub     Standing Status:   Future     Standing Expiration Date: 7/12/2025     Scheduling Instructions:      Bring along any outside films relating to this procedure            Stone risk profile     Standing Status:   Future     Standing Expiration Date:   7/12/2022       KATERINA Julian

## 2021-07-12 NOTE — PATIENT INSTRUCTIONS
Maintain adequate hydration upwards to 40-60 oz of water intake per day  Follow a low-salt/low oxalate diet  Add lemon/lime/cranberry/citrus to order daily  Repeat stone risk profile in approximately 6 months to re-evaluate status  Call the office for questions or concerns

## 2021-08-30 DIAGNOSIS — E03.9 PRIMARY HYPOTHYROIDISM: Primary | ICD-10-CM

## 2021-08-30 RX ORDER — LEVOTHYROXINE SODIUM 137 UG/1
137 TABLET ORAL DAILY
Qty: 90 TABLET | Refills: 1 | Status: SHIPPED | OUTPATIENT
Start: 2021-08-30 | End: 2021-12-27 | Stop reason: SDUPTHER

## 2021-12-17 ENCOUNTER — APPOINTMENT (OUTPATIENT)
Dept: LAB | Age: 46
End: 2021-12-17
Payer: COMMERCIAL

## 2021-12-17 DIAGNOSIS — E03.9 PRIMARY HYPOTHYROIDISM: ICD-10-CM

## 2021-12-17 DIAGNOSIS — E78.00 HYPERCHOLESTEREMIA: ICD-10-CM

## 2021-12-17 LAB
ALBUMIN SERPL BCP-MCNC: 4 G/DL (ref 3.5–5)
ALP SERPL-CCNC: 79 U/L (ref 46–116)
ALT SERPL W P-5'-P-CCNC: 32 U/L (ref 12–78)
ANION GAP SERPL CALCULATED.3IONS-SCNC: 5 MMOL/L (ref 4–13)
AST SERPL W P-5'-P-CCNC: 20 U/L (ref 5–45)
BILIRUB SERPL-MCNC: 0.41 MG/DL (ref 0.2–1)
BUN SERPL-MCNC: 20 MG/DL (ref 5–25)
CALCIUM SERPL-MCNC: 10.1 MG/DL (ref 8.3–10.1)
CHLORIDE SERPL-SCNC: 106 MMOL/L (ref 100–108)
CHOLEST SERPL-MCNC: 186 MG/DL
CO2 SERPL-SCNC: 27 MMOL/L (ref 21–32)
CREAT SERPL-MCNC: 0.84 MG/DL (ref 0.6–1.3)
GFR SERPL CREATININE-BSD FRML MDRD: 104 ML/MIN/1.73SQ M
GLUCOSE P FAST SERPL-MCNC: 85 MG/DL (ref 65–99)
HDLC SERPL-MCNC: 40 MG/DL
LDLC SERPL CALC-MCNC: 116 MG/DL (ref 0–100)
NONHDLC SERPL-MCNC: 146 MG/DL
POTASSIUM SERPL-SCNC: 4.2 MMOL/L (ref 3.5–5.3)
PROT SERPL-MCNC: 7.3 G/DL (ref 6.4–8.2)
SODIUM SERPL-SCNC: 138 MMOL/L (ref 136–145)
T4 FREE SERPL-MCNC: 1.05 NG/DL (ref 0.76–1.46)
TRIGL SERPL-MCNC: 151 MG/DL
TSH SERPL DL<=0.05 MIU/L-ACNC: 0.66 UIU/ML (ref 0.36–3.74)

## 2021-12-17 PROCEDURE — 80061 LIPID PANEL: CPT

## 2021-12-17 PROCEDURE — 36415 COLL VENOUS BLD VENIPUNCTURE: CPT

## 2021-12-17 PROCEDURE — 80053 COMPREHEN METABOLIC PANEL: CPT

## 2021-12-17 PROCEDURE — 84443 ASSAY THYROID STIM HORMONE: CPT

## 2021-12-17 PROCEDURE — 84439 ASSAY OF FREE THYROXINE: CPT

## 2021-12-27 ENCOUNTER — OFFICE VISIT (OUTPATIENT)
Dept: INTERNAL MEDICINE CLINIC | Facility: OTHER | Age: 46
End: 2021-12-27
Payer: COMMERCIAL

## 2021-12-27 VITALS
OXYGEN SATURATION: 98 % | BODY MASS INDEX: 29.12 KG/M2 | HEART RATE: 97 BPM | TEMPERATURE: 97.5 F | WEIGHT: 208 LBS | SYSTOLIC BLOOD PRESSURE: 130 MMHG | DIASTOLIC BLOOD PRESSURE: 70 MMHG | HEIGHT: 71 IN

## 2021-12-27 DIAGNOSIS — F17.200 SMOKING: Chronic | ICD-10-CM

## 2021-12-27 DIAGNOSIS — Z11.59 ENCOUNTER FOR HEPATITIS C SCREENING TEST FOR LOW RISK PATIENT: ICD-10-CM

## 2021-12-27 DIAGNOSIS — Z28.21 INFLUENZA VACCINATION DECLINED: ICD-10-CM

## 2021-12-27 DIAGNOSIS — E03.9 PRIMARY HYPOTHYROIDISM: ICD-10-CM

## 2021-12-27 DIAGNOSIS — E78.00 HYPERCHOLESTEREMIA: Primary | ICD-10-CM

## 2021-12-27 DIAGNOSIS — Z28.9 COVID-19 VACCINATION NOT DONE: ICD-10-CM

## 2021-12-27 DIAGNOSIS — E55.9 VITAMIN D DEFICIENCY: ICD-10-CM

## 2021-12-27 DIAGNOSIS — Z12.11 SCREENING FOR COLON CANCER: ICD-10-CM

## 2021-12-27 PROBLEM — M25.559 HIP PAIN: Status: RESOLVED | Noted: 2021-06-14 | Resolved: 2021-12-27

## 2021-12-27 PROCEDURE — 3008F BODY MASS INDEX DOCD: CPT | Performed by: FAMILY MEDICINE

## 2021-12-27 PROCEDURE — 99214 OFFICE O/P EST MOD 30 MIN: CPT | Performed by: FAMILY MEDICINE

## 2021-12-27 PROCEDURE — 4004F PT TOBACCO SCREEN RCVD TLK: CPT | Performed by: FAMILY MEDICINE

## 2021-12-27 RX ORDER — LEVOTHYROXINE SODIUM 137 UG/1
137 TABLET ORAL DAILY
Qty: 90 TABLET | Refills: 1 | Status: SHIPPED | OUTPATIENT
Start: 2021-12-27

## 2022-04-27 ENCOUNTER — TELEMEDICINE (OUTPATIENT)
Dept: INTERNAL MEDICINE CLINIC | Age: 47
End: 2022-04-27
Payer: COMMERCIAL

## 2022-04-27 ENCOUNTER — TELEPHONE (OUTPATIENT)
Dept: INTERNAL MEDICINE CLINIC | Facility: CLINIC | Age: 47
End: 2022-04-27

## 2022-04-27 VITALS — TEMPERATURE: 98.6 F

## 2022-04-27 DIAGNOSIS — U07.1 COVID-19: ICD-10-CM

## 2022-04-27 DIAGNOSIS — U07.1 COVID-19: Primary | ICD-10-CM

## 2022-04-27 PROCEDURE — 99213 OFFICE O/P EST LOW 20 MIN: CPT | Performed by: NURSE PRACTITIONER

## 2022-04-27 PROCEDURE — 3725F SCREEN DEPRESSION PERFORMED: CPT | Performed by: NURSE PRACTITIONER

## 2022-04-27 PROCEDURE — 4004F PT TOBACCO SCREEN RCVD TLK: CPT | Performed by: NURSE PRACTITIONER

## 2022-04-27 NOTE — PROGRESS NOTES
COVID-19 Outpatient Progress Note    Assessment/Plan:    Problem List Items Addressed This Visit        Other    COVID-19 - Primary    Relevant Medications    nirmatrelvir & ritonavir (Paxlovid) tablet therapy pack         Disposition:     Patient has COVID-19 infection  Based off CDC guidelines, they were recommended to isolate for 5 days from the date of the positive test  If they remain asymptomatic, isolation may be ended followed by 5 days of wearing a mask when around othes to minimize risk of infecting others  If they have a fever, continue to stay home until fever resolves for at least 24 hours  Discussed symptom directed medication options with patient  Discussed vitamin D, vitamin C, and/or zinc supplementation with patient  Patient meets criteria for PAXLOVID and they have been counseled appropriately according to EUA documentation released by the FDA  After discussion, patient agrees to treatment  [address] is an investigational medicine used to treat mild-to-moderate COVID-19 in adults and children (15years of age and older weighing at least 80 pounds (40 kg)) with positive results of direct SARS-CoV-2 viral testing, and who are at high risk for progression to severe COVID-19, including hospitalization or death  PAXLOVID is investigational because it is still being studied  There is limited information about the safety and effectiveness of using PAXLOVID to treat people with mild-to-moderate COVID-19  The FDA has authorized the emergency use of PAXLOVID for the treatment of mild-tomoderate COVID-19 in adults and children (15years of age and older weighing at least 80 pounds (40 kg)) with a positive test for the virus that causes COVID-19, and who are at high risk for progression to severe COVID-19, including hospitalization or death, under an EUA  What should I tell my healthcare provider before I take PAXLOVID?     Tell your healthcare provider if you:  - Have any allergies  - Have liver or kidney disease  - Are pregnant or plan to become pregnant  - Are breastfeeding a child  - Have any serious illnesses    Tell your healthcare provider about all the medicines you take, including prescription and over-the-counter medicines, vitamins, and herbal supplements  Some medicines may interact with PAXLOVID and may cause serious side effects  Keep a list of your medicines to show your healthcare provider and pharmacist when you get a new medicine  You can ask your healthcare provider or pharmacist for a list of medicines that interact with PAXLOVID  Do not start taking a new medicine without telling your healthcare provider  Your healthcare provider can tell you if it is safe to take PAXLOVID with other medicines  Tell your healthcare provider if you are taking combined hormonal contraceptive  PAXLOVID may affect how your birth control pills work  Females who are able to become pregnant should use another effective alternative form of contraception or an additional barrier method of contraception  Talk to your healthcare provider if you have any questions about contraceptive methods that might be right for you  How do I take PAXLOVID? PAXLOVID consists of 2 medicines: nirmatrelvir and ritonavir  - Take 2 pink tablets of nirmatrelvir with 1 white tablet of ritonavir by mouth 2 times each day (in the morning and in the evening) for 5 days  For each dose, take all 3 tablets at the same time  - If you have kidney disease, talk to your healthcare provider  You may need a different dose  - Swallow the tablets whole  Do not chew, break, or crush the tablets  - Take PAXLOVID with or without food  - Do not stop taking PAXLOVID without talking to your healthcare provider, even if you feel better  - If you miss a dose of PAXLOVID within 8 hours of the time it is usually taken, take it as soon as you remember   If you miss a dose by more than 8 hours, skip the missed dose and take the next dose at your regular time  Do not take 2 doses of PAXLOVID at the same time  - If you take too much PAXLOVID, call your healthcare provider or go to the nearest hospital emergency room right away  - If you are taking a ritonavir- or cobicistat-containing medicine to treat hepatitis C or Human Immunodeficiency Virus (HIV), you should continue to take your medicine as prescribed by your healthcare provider   - Talk to your healthcare provider if you do not feel better or if you feel worse after 5 days  Who should generally not take PAXLOVID? Do not take PAXLOVID if:  You are allergic to nirmatrelvir, ritonavir, or any of the ingredients in PAXLOVID  You are taking any of the following medicines:  - Alfuzosin  - Pethidine, piroxicam, propoxyphene  - Ranolazine  - Amiodarone, dronedarone, flecainide, propafenone, quinidine  - Colchicine  - Lurasidone, pimozide, clozapine  - Dihydroergotamine, ergotamine, methylergonovine  - Lovastatin, simvastatin  - Sildenafil (Revatio®) for pulmonary arterial hypertension (PAH)  - Triazolam, oral midazolam  - Apalutamide  - Carbamazepine, phenobarbital, phenytoin  - Rifampin  - St  Reinaldos Wort (hypericum perforatum)    What are the important possible side effects of PAXLOVID? Possible side effects of PAXLOVID are:  - Liver Problems  Tell your healthcare provider right away if you have any of these signs and symptoms of liver problems: loss of appetite, yellowing of your skin and the whites of eyes (jaundice), dark-colored urine, pale colored stools and itchy skin, stomach area (abdominal) pain  - Resistance to HIV Medicines  If you have untreated HIV infection, PAXLOVID may lead to some HIV medicines not working as well in the future  - Other possible side effects include: altered sense of taste, diarrhea, high blood pressure, or muscle aches    These are not all the possible side effects of PAXLOVID  Not many people have taken PAXLOVID   Serious and unexpected side effects may happen  Noni Peters is still being studied, so it is possible that all of the risks are not known at this time  What other treatment choices are there? Like Kyle Dodge may allow for the emergency use of other medicines to treat people with COVID-19  Go to https://Zoopla/ for information on the emergency use of other medicines that are authorized by FDA to treat people with COVID-19  Your healthcare provider may talk with you about clinical trials for which you may be eligible  It is your choice to be treated or not to be treated with PAXLOVID  Should you decide not to receive it or for your child not to receive it, it will not change your standard medical care  What if I am pregnant or breastfeeding? There is no experience treating pregnant women or breastfeeding mothers with PAXLOVID  For a mother and unborn baby, the benefit of taking PAXLOVID may be greater than the risk from the treatment  If you are pregnant, discuss your options and specific situation with your healthcare provider  It is recommended that you use effective barrier contraception or do not have sexual activity while taking PAXLOVID  If you are breastfeeding, discuss your options and specific situation with your healthcare provider  How do I report side effects with PAXLOVID? Contact your healthcare provider if you have any side effects that bother you or do not go away  Report side effects to FDA MedWatch at www fda gov/medwatch or call 0-676-XEM5842 or you can report side effects to [facility]  at the contact information provided below  Website Fax number Telephone number   [place] [phone]     How should I store Noni Peters? Store PAXLOVID tablets at room temperature between 68°F to 77°F (20°C to 25°C)      Full fact sheet for patients, parents, and caregivers can be found at: Ted beyer    I have spent 15 minutes directly with the patient  Encounter provider KATERINA Vivas    Provider located at [address] [address]    Recent Visits  No visits were found meeting these conditions  Showing recent visits within past 7 days and meeting all other requirements  Today's Visits  Date Type Provider Dept   04/27/22 Telephone KATERINA Vivas [facility]   04/27/22 [facility], KATERINA Pg [facility]   Showing today's visits and meeting all other requirements  Future Appointments  No visits were found meeting these conditions  Showing future appointments within next 150 days and meeting all other requirements     This virtual check-in was done via [employer] and patient was informed that this is a secure, HIPAA-compliant platform  He agrees to proceed  Patient agrees to participate in a virtual check in via telephone or video visit instead of presenting to the office to address urgent/immediate medical needs  Patient is aware this is a billable service  After connecting through [facility], the patient was identified by name and date of birth  Altva Elder was informed that this was a telemedicine visit and that the exam was being conducted confidentially over secure lines  My office door was closed  No one else was in the room  Elis Elder acknowledged consent and understanding of privacy and security of the telemedicine visit  I informed the patient that I have reviewed his record in Epic and presented the opportunity for him to ask any questions regarding the visit today  The patient agreed to participate      Verification of patient location:  Patient is located in the following state in which I hold an active license: [place]    Subjective:   Juanita Clarke Peyton Gonzales is a 51 y o  male who has been screened for COVID-19  Symptom change since last report: unchanged  Patient's symptoms include fatigue, malaise, nasal congestion, rhinorrhea, sore throat, cough (dry/productive ), chest tightness and headache  Patient denies fever, chills, anosmia, loss of taste, shortness of breath, abdominal pain, nausea, vomiting, diarrhea and myalgias  - Date of symptom onset: 4/26/2022  - Date of positive COVID-19 test: 4/27/2022  Type of test: Rapid antigen  COVID-19 vaccination status: Not vaccinated    Gerson Wilhelm has been staying home and has isolated themselves in his home  He is taking care to not share personal items and is cleaning all surfaces that are touched often, like counters, tabletops, and doorknobs using household cleaning sprays or wipes  He is wearing a mask when he leaves his room  Taking decongest and tylenol     Lab Results   Component Value Date    SARSCOV2 Not Detected 07/10/2020     Past Medical History:   Diagnosis Date    Disease of thyroid gland     Gastroesophageal reflux disease 10/29/2020    GERD (gastroesophageal reflux disease)     Kidney stone     R side- for laser/stent today 10/29/2020    Smoking 10/29/2020    Wears contact lenses      Past Surgical History:   Procedure Laterality Date    COLONOSCOPY      FL RETROGRADE PYELOGRAM  10/29/2020    KIDNEY STONE SURGERY  2015    AR CYSTO/URETERO W/LITHOTRIPSY &INDWELL STENT INSRT Right 10/29/2020    Procedure: CYSTO, URETEROSCOPY W/LASER lithotripsy, RETROGRADE PYELOGRAM, stone basket extraction, insertion double J STENT;  Surgeon: Bianca Ashley MD;  Location: [facility];  Service: Urology    AR FRAGMENT KIDNEY STONE/ ESWL Right 7/17/2020    Procedure: LITHOTRIPSY EXTRACORPORAL SHOCKWAVE (ESWL);   Surgeon: Bianca Ashley MD;  Location: [facility];  Service: Urology     Current Outpatient Medications   Medication Sig Dispense Refill    acetaminophen (TYLENOL) 500 mg tablet Take 500 mg by mouth every 6 (six) hours as needed for mild pain or fever      ibuprofen (ADVIL) 200 mg tablet Take 200 mg by mouth every 4 (four) hours as needed        Krill Oil 500 MG CAPS Take 1 tablet by mouth daily Pt is taking Krill oil 500 mg daily per office note on 4/27/22       levothyroxine 137 mcg tablet Take 1 tablet (137 mcg total) by mouth daily 90 tablet 1    MAGNESIUM PO Take by mouth daily 250 mg daily       nirmatrelvir & ritonavir (Paxlovid) tablet therapy pack Take 3 tablets by mouth 2 (two) times a day for 5 days Take 2 nirmatrelvir tablets + 1 ritonavir tablet together per dose 30 tablet 0     No current facility-administered medications for this visit  No Known Allergies    Review of Systems   Constitutional: Positive for fatigue  Negative for activity change, appetite change, chills, diaphoresis and fever  HENT: Positive for congestion, rhinorrhea, sinus pressure and sore throat  Negative for ear discharge, ear pain, postnasal drip and sinus pain  Eyes: Negative for pain, discharge, itching and visual disturbance  Respiratory: Positive for cough (dry/productive ) and chest tightness  Negative for shortness of breath and wheezing  Cardiovascular: Negative for chest pain, palpitations and leg swelling  Gastrointestinal: Negative for abdominal pain, constipation, diarrhea, nausea and vomiting  Endocrine: Negative for polydipsia, polyphagia and polyuria  Genitourinary: Negative for difficulty urinating, dysuria and urgency  Musculoskeletal: Negative for arthralgias, back pain, myalgias and neck pain  Skin: Negative for rash and wound  Neurological: Positive for headaches  Negative for dizziness, weakness and numbness  Objective:    Vitals:    04/27/22 1227   Temp: 98 6 °F (37 °C)   TempSrc: Oral       Physical Exam  Vitals reviewed  Constitutional:       Appearance: Normal appearance  Pulmonary:      Effort: No respiratory distress     Neurological:      General: No focal deficit present  Mental Status: He is alert and oriented to person, place, and time  Psychiatric:         Mood and Affect: Mood normal          Behavior: Behavior normal          VIRTUAL VISIT [facility] verbally agrees to participate in [employer]  Pt is aware that [employer] could be limited without vital signs or the ability to perform a full hands-on physical Jozef Speak understands he or the provider may request at any time to terminate the video visit and request the patient to seek care or treatment in person

## 2022-04-27 NOTE — TELEPHONE ENCOUNTER
Patient was seen by Srinivasa Maxwell today  Patient needs his prescription to go to [facility]   Patient can not  at [facility]

## 2022-08-31 DIAGNOSIS — E03.9 PRIMARY HYPOTHYROIDISM: ICD-10-CM

## 2022-08-31 RX ORDER — LEVOTHYROXINE SODIUM 137 UG/1
137 TABLET ORAL DAILY
Qty: 90 TABLET | Refills: 1 | Status: SHIPPED | OUTPATIENT
Start: 2022-08-31

## 2022-08-31 NOTE — TELEPHONE ENCOUNTER
Rx refill for levothyroxine 137 mcg tablet      Send to [facility]/pharmacy #[id number]- MELANIE IZAGUIRRE - [facility]- 4/27/22 NOV- 11/2/22

## 2022-10-31 ENCOUNTER — APPOINTMENT (OUTPATIENT)
Dept: LAB | Age: 47
End: 2022-10-31

## 2022-10-31 DIAGNOSIS — Z11.59 ENCOUNTER FOR HEPATITIS C SCREENING TEST FOR LOW RISK PATIENT: ICD-10-CM

## 2022-10-31 DIAGNOSIS — E55.9 VITAMIN D DEFICIENCY: ICD-10-CM

## 2022-10-31 DIAGNOSIS — E03.9 PRIMARY HYPOTHYROIDISM: ICD-10-CM

## 2022-10-31 DIAGNOSIS — E78.00 HYPERCHOLESTEREMIA: ICD-10-CM

## 2022-10-31 LAB
25(OH)D3 SERPL-MCNC: 45.7 NG/ML (ref 30–100)
T4 FREE SERPL-MCNC: 1.29 NG/DL (ref 0.76–1.46)
TSH SERPL DL<=0.05 MIU/L-ACNC: 0.3 UIU/ML (ref 0.45–4.5)

## 2022-11-01 LAB — HCV AB SER QL: NORMAL

## 2022-11-02 ENCOUNTER — OFFICE VISIT (OUTPATIENT)
Dept: INTERNAL MEDICINE CLINIC | Facility: CLINIC | Age: 47
End: 2022-11-02

## 2022-11-02 ENCOUNTER — APPOINTMENT (OUTPATIENT)
Dept: RADIOLOGY | Age: 47
End: 2022-11-02

## 2022-11-02 VITALS
HEIGHT: 71 IN | BODY MASS INDEX: 29.15 KG/M2 | OXYGEN SATURATION: 98 % | DIASTOLIC BLOOD PRESSURE: 70 MMHG | TEMPERATURE: 97 F | HEART RATE: 75 BPM | WEIGHT: 208.2 LBS | SYSTOLIC BLOOD PRESSURE: 110 MMHG

## 2022-11-02 DIAGNOSIS — M54.50 LUMBAR PAIN: ICD-10-CM

## 2022-11-02 DIAGNOSIS — Z28.21 INFLUENZA VACCINATION DECLINED: ICD-10-CM

## 2022-11-02 DIAGNOSIS — E03.9 PRIMARY HYPOTHYROIDISM: ICD-10-CM

## 2022-11-02 DIAGNOSIS — F17.200 SMOKING: Chronic | ICD-10-CM

## 2022-11-02 DIAGNOSIS — E78.00 HYPERCHOLESTEREMIA: Primary | ICD-10-CM

## 2022-11-02 LAB
CHOLEST SERPL-MCNC: 192 MG/DL
HDLC SERPL-MCNC: 45 MG/DL
LDLC SERPL CALC-MCNC: 128 MG/DL (ref 0–100)
TRIGL SERPL-MCNC: 97 MG/DL

## 2022-11-02 NOTE — PROGRESS NOTES
Assessment/Plan:    1  Hypercholesteremia  -     Lipid Panel with Direct LDL reflex; Future  -     CBC and differential; Future  -     Lipid Panel with Direct LDL reflex; Future    2  Primary hypothyroidism  -     CBC and differential; Future  -     Comprehensive metabolic panel; Future  -     TSH, 3rd generation with Free T4 reflex; Future    3  Influenza vaccination declined    4  Smoking    5  Lumbar pain  -     XR spine lumbar minimum 4 views non injury; Future; Expected date: 11/02/2022  -     Ambulatory Referral to Chiropractic; Future      BMI Counseling: Body mass index is 29 04 kg/m²  The BMI is above normal  Nutrition recommendations include moderation in carbohydrate intake  Exercise recommendations include exercising 3-5 times per week  No pharmacotherapy was ordered  Rationale for BMI follow-up plan is due to patient being overweight or obese  Depression Screening and Follow-up Plan: Patient was screened for depression during today's encounter  They screened negative with a PHQ-2 score of 0      check x-ray, referred chiropractor for realignment  Recommend massage  May need additional studies if not better  No recent renal calculi issues per patient    There are no Patient Instructions on file for this visit  Return in about 1 year (around 11/2/2023) for Recheck  Subjective:      Patient ID: Mamie Love is a 49 y o  male  Chief Complaint   Patient presents with   • Follow-up     Overdue 6 m f/u visit, review labs from 10/31/22  He c/o pain in the center of his back on the belt line x 1 month and is getting worse  He tried Aleve without relief  Declines a flu vaccine  HPI    Primary hypothyroidism, on Synthroid 137 mcg   Recently had lab work which shows slightly hyper TSH but normal T4   Not having any symptoms of hypo or hyperthyroidism and compliant with medication   Does not need refills  December 2021, needs refills  Compliant with medications    No breakthrough symptoms except for some issues with sleeping due to his work schedule change  November 2022, compliant medications  No breakthrough symptoms  TSH slightly in the hypo range but T4 appropriate     Hypercholesterolemia, not on medications   Had lab work done in December which showed elevated cholesterol levels however currently they are 20 points better with dietary modifications  Mitra Rivera is very active and does martial arts 3 times per week   Zak Mccray 2021, not on medications  Triglycerides slightly increased  Was taking over-the-counter Krill oil but now stopped  Does not do martial arts at the moment due to his change in work schedule   Camelia Durand 2022, last lipid levels in range  Will add to the blood work this time since it was fasting  Will repeat in 1 year if normal     Smoking, slightly less than 1ppd, not interested in quitting at this time    Back pain:  Started 1 month ago lower lumbar midline  Has difficulty standing straight from a seated position or when getting out of his truck  Cannot lay flat at night and has to lay on this side  Tried Aleve for 3 days with no relief  He does do heavy lifting and yd work and has been cleaning up leaves around his yd but cannot think of anything he did for this  Of note he usually has this issue in the winter during shoveling snow all where his back “goes out”  He used to see a chiropractor in the past but then got tired of going on a regular basis  He denies any radiating pain down any leg and he denies any trauma            The following portions of the patient's history were reviewed and updated as appropriate: allergies, current medications, past family history, past medical history, past social history, past surgical history and problem list     Review of Systems      Constitutional:  Denies fever or chills   Eyes:  Denies double , blurry vision or eye pain  HENT:  Denies nasal congestion or sore throat   Respiratory:  Denies cough or shortness of breath or wheezing  Cardiovascular:  Denies palpitations or chest pain  GI:  Denies abdominal pain, nausea, or vomiting, no loose stools, no reflux  Integument:  Denies rash , no open areas  Neurologic:  Denies headache or focal weakness, no dizziness  : no dysuria, or hematuria        Current Outpatient Medications   Medication Sig Dispense Refill   • Cholecalciferol (VITAMIN D3 PO) Take by mouth in the morning     • Krill Oil 500 MG CAPS Take 500 mg by mouth daily     • levothyroxine 137 mcg tablet Take 1 tablet (137 mcg total) by mouth daily 90 tablet 1   • MAGNESIUM PO Take by mouth daily 250 mg daily        No current facility-administered medications for this visit  Objective:    /70 (BP Location: Left arm, Patient Position: Sitting, Cuff Size: Standard)   Pulse 75   Temp (!) 97 °F (36 1 °C) (Tympanic)   Ht 5' 11" (1 803 m)   Wt 94 4 kg (208 lb 3 2 oz)   SpO2 98%   BMI 29 04 kg/m²        Physical Exam       Constitutional:  Well developed, well nourished, no acute distress, non-toxic appearance   Eyes:  PERRL, conjunctiva normal , non icteric sclera  HENT:  Atraumatic, oropharynx moist  Neck-  supple   Respiratory:  CTA b/l, normal breath sounds, no rales, no wheezing   Cardiovascular:  RRR, no murmurs, no LE edema b/l  GI:  Soft, nondistended, normal bowel sounds x 4, nontender, no organomegaly, no mass, no rebound, no guarding   Neurologic:  no focal deficits noted   Psychiatric:  Speech and behavior appropriate , AAO x 3  Musculoskeletal, gait is slow and stiff  No spinal pain to palpation, unable to reproduce his discomfort, no CVA tenderness bilaterally    No paraspinal muscular hypertonicity      Jerolyn Factor

## 2022-11-21 ENCOUNTER — TELEPHONE (OUTPATIENT)
Dept: INTERNAL MEDICINE CLINIC | Facility: CLINIC | Age: 47
End: 2022-11-21

## 2022-11-21 DIAGNOSIS — E03.9 PRIMARY HYPOTHYROIDISM: ICD-10-CM

## 2022-11-21 RX ORDER — LEVOTHYROXINE SODIUM 137 UG/1
137 TABLET ORAL DAILY
Qty: 90 TABLET | Refills: 1 | Status: SHIPPED | OUTPATIENT
Start: 2022-11-21

## 2023-02-05 ENCOUNTER — HOSPITAL ENCOUNTER (EMERGENCY)
Facility: HOSPITAL | Age: 48
Discharge: HOME | End: 2023-02-05
Attending: EMERGENCY MEDICINE

## 2023-02-05 VITALS
TEMPERATURE: 98 F | RESPIRATION RATE: 17 BRPM | DIASTOLIC BLOOD PRESSURE: 92 MMHG | OXYGEN SATURATION: 99 % | SYSTOLIC BLOOD PRESSURE: 139 MMHG | HEART RATE: 94 BPM

## 2023-02-05 DIAGNOSIS — M54.9 BACK PAIN: ICD-10-CM

## 2023-02-05 DIAGNOSIS — M54.32 SCIATICA OF LEFT SIDE: Primary | ICD-10-CM

## 2023-02-05 RX ORDER — METHYLPREDNISOLONE 4 MG/1
TABLET ORAL
Qty: 21 TABLET | Refills: 0 | Status: SHIPPED | OUTPATIENT
Start: 2023-02-05

## 2023-02-05 NOTE — ED PROVIDER NOTES
History  Chief Complaint   Patient presents with   • Back Pain     Pt c/o lower back pain radiating to L leg since Thursday  Denies injury/trauma  Reports hx sciatica     28-year-old male presented for evaluation of low back pain radiating to the left leg since Thursday  Patient stated that on Thursday night, he was taking a shower; when he bent over to wash his left leg, he felt a pop in his lower back and has since been feeling pain as well as numbness in his left leg  Patient denied any bowel or bladder symptoms  There is no fever or urinary symptoms  Patient stated he went to see his chiropractor the following day for a massage which made  the pain much better, but the numbness is still present  Patient is seen at bedside in no acute distress  He rates his pain 3 out of 10 but reports left leg numbness  Prior to Admission Medications   Prescriptions Last Dose Informant Patient Reported? Taking?    Cholecalciferol (VITAMIN D3 PO)   Yes No   Sig: Take by mouth in the morning   Krill Oil 500 MG CAPS   Yes No   Sig: Take 500 mg by mouth daily   MAGNESIUM PO   Yes No   Sig: Take by mouth daily 250 mg daily    levothyroxine 137 mcg tablet   No No   Sig: Take 1 tablet (137 mcg total) by mouth daily      Facility-Administered Medications: None       Past Medical History:   Diagnosis Date   • Disease of thyroid gland    • Gastroesophageal reflux disease 10/29/2020   • GERD (gastroesophageal reflux disease)    • Kidney stone     R side- for laser/stent today 10/29/2020   • Smoking 10/29/2020   • Wears contact lenses        Past Surgical History:   Procedure Laterality Date   • COLONOSCOPY     • [place] RETROGRADE PYELOGRAM  10/29/2020   • KIDNEY STONE SURGERY  2015   • [place] CYSTO/URETERO W/LITHOTRIPSY &INDWELL STENT INSRT Right 10/29/2020    Procedure: CYSTO, URETEROSCOPY W/LASER lithotripsy, RETROGRADE PYELOGRAM, stone basket extraction, insertion double J STENT;  Surgeon: Zita Wright MD;  Location: [facility] OR;  Service: Urology   • [place] LITHOTRIPSY XTRCORP SHOCK WAVE Right 7/17/2020    Procedure: LITHOTRIPSY EXTRACORPORAL SHOCKWAVE (ESWL); Surgeon: Chris Steiner MD;  [facility];  Service: Urology       Family History   Problem Relation Age of Onset   • No Known Problems Mother    • Cancer Father         unknown   • Thyroid disease Father    • Colon cancer Maternal Grandfather    • Completed Suicide  Brother      I have reviewed and agree with the history as documented  E-Cigarette/Vaping   • E-Cigarette Use Never User      E-Cigarette/Vaping Substances   • Nicotine No    • THC No    • CBD No    • Flavoring No    • Other No    • Unknown No      Social History     Tobacco Use   • Smoking status: Every Day     Packs/day: 1 00     Years: 28 00     Pack years: 28 00     Types: Cigarettes     Start date: 1993   • Smokeless tobacco: Never   • Tobacco comments:     pt reports 3/4 of a ppd now   Vaping Use   • Vaping Use: Never used   Substance Use Topics   • Alcohol use: Not Currently     Comment: 12 drinks per year   • Drug use: No        Review of Systems   Constitutional: Negative for chills and fever  HENT: Negative for ear pain and sore throat  Eyes: Negative for pain and visual disturbance  Respiratory: Negative for cough and shortness of breath  Cardiovascular: Negative for chest pain and palpitations  Gastrointestinal: Negative for abdominal pain and vomiting  Genitourinary: Negative for dysuria and hematuria  Musculoskeletal: Positive for back pain  Negative for arthralgias  Skin: Negative for color change and rash  Neurological: Positive for numbness (Left leg)  Negative for seizures, syncope and weakness  All other systems reviewed and are negative        Physical Exam  ED Triage Vitals [02/05/23 1304]   Temperature Pulse Respirations Blood Pressure SpO2   98 °F (36 7 °C) 94 17 139/92 99 %      Temp Source Heart Rate Source Patient Position - Orthostatic VS BP Location FiO2 (%) Oral Monitor Sitting Right arm --      Pain Score       --             Orthostatic Vital Signs  Vitals:    02/05/23 1304   BP: 139/92   Pulse: 94   Patient Position - Orthostatic VS: Sitting       Physical Exam  Vitals and nursing note reviewed  Constitutional:       General: He is not in acute distress  Appearance: He is well-developed  HENT:      Head: Normocephalic and atraumatic  Eyes:      Conjunctiva/sclera: Conjunctivae normal    Cardiovascular:      Rate and Rhythm: Normal rate and regular rhythm  Heart sounds: No murmur heard  Pulmonary:      Effort: Pulmonary effort is normal  No respiratory distress  Breath sounds: Normal breath sounds  Abdominal:      Palpations: Abdomen is soft  Tenderness: There is no abdominal tenderness  Musculoskeletal:         General: Tenderness (Low back) present  No swelling  Cervical back: Neck supple  Legs:       Comments: Numbness present on the left leg  Mild lower back tenderness present   Skin:     General: Skin is warm and dry  Capillary Refill: Capillary refill takes less than 2 seconds  Neurological:      General: No focal deficit present  Mental Status: He is alert  Sensory: Sensory deficit (Mild numbness on the lateral aspect of the left leg ) present  Motor: No weakness  Psychiatric:         Mood and Affect: Mood normal          ED Medications  Medications - No data to display    Diagnostic Studies  Results Reviewed     None                 No orders to display         Procedures  Procedures      ED Course  ED Course as of 02/05/23 2025   Sun Feb 05, 2023   150 58-year-old male presented for evaluation of low back pain radiating to the left leg as well as numbness  He denied bowel or bladder symptoms, fever or any constitutional symptoms     1511 Patient will discharge from the emergency department with a prescription of Medrol pack as well as a referral to [place]  Return precaution discussed  Medical Decision Making  40-year-old male presented for evaluation of low back pain radiating to the left leg as well as numbness  He denied bowel or bladder symptoms, denied fever or any constitutional symptoms  Patient  discharged from the emergency department with a prescription of Medrol pack as well as a referral to [facility] program   Return precautions discussed  Back pain: acute illness or injury  Sciatica of left side: acute illness or injury  Risk  Prescription drug management  Disposition  Final diagnoses:   Sciatica of left side   Back pain     Time reflects when diagnosis was documented in both MDM as applicable and the Disposition within this note     Time User Action Codes Description Comment    2/5/2023  2:31 PM Nelda Mast Add [M54 32] Sciatica of left side     2/5/2023  2:40 PM Nelda Mast Add [M54 9] Back pain       ED Disposition     ED Disposition   Discharge    Condition   Stable    Date/Time   Sun Feb 5, 2023  2:41 PM    [address] [place] discharge to home/self care                 Follow-up Information     Follow up With Specialties Details Why Contact Info Additional Information    [facility] Physical Therapy Schedule an appointment as soon as possible for a visit   [phone]          Discharge Medication List as of 2/5/2023  2:42 PM      START taking these medications    Details   methylPREDNISolone 4 MG tablet therapy pack Use as directed on package, Normal         CONTINUE these medications which have NOT CHANGED    Details   Cholecalciferol (VITAMIN D3 PO) Take by mouth in the morning, Historical Med      Krill Oil 500 MG CAPS Take 500 mg by mouth daily, Historical Med      levothyroxine 137 mcg tablet Take 1 tablet (137 mcg total) by mouth daily, Starting Mon 11/21/2022, Normal      MAGNESIUM PO Take by mouth daily 250 mg daily , Historical Med           No discharge procedures on file  PDMP Review     None           ED Provider  Attending physically available and evaluated Chip Hdz  MANDI managed the patient along with the ED Attending      Electronically Signed by         Gerhard Craft MD  02/05/23 2025

## 2023-02-05 NOTE — DISCHARGE INSTRUCTIONS
Take Medrol pack as directed on package  Call [facility] Nikia Kaur to schedule an appointment  Return sooner to the ED if you experience severe pain, loss of bladder/ bowel tone

## 2023-02-07 ENCOUNTER — VBI (OUTPATIENT)
Dept: INTERNAL MEDICINE CLINIC | Facility: CLINIC | Age: 48
End: 2023-02-07

## 2023-02-07 NOTE — TELEPHONE ENCOUNTER
Delbert Cid    ED Visit Information     Ed visit date: 2-5-23  Diagnosis Description: Sciatica of left side; Back pain  In Network? Yes [address]  Discharge status: Home  Discharged with meds ? Yes  Number of ED visits to date: 1  ED Severity:N/A     Outreach Information    Outreach successful: No 2  Date letter mailed:0  Date Finalized:2-8-23    Care Coordination    Follow up appointment with pcp: no PCP f/u  Transportation issues ? NA    [employer] Outreach    Outreach type: 3 Day Outreach  Emergent necessity warranted by diagnosis: No  [facility] PCP: Yes  Transportation: Self Transport  Called PCP first?: No  Reason Patient went to ED instead of Urgent Care or PCP?: Perceived Severity of Illness  02/07/2023 11:33 AM EST by Ewa Brennan 02/07/2023 11:33 AM EST by Ewa Murphy (Self) [phone] (DACN)[phone] (Home)  [phone] (Home) Remove  - Left MessageCommunicated - Attempt 1    02/08/2023 11:15 AM EST by Ewa Brennan 02/08/2023 11:15 AM EST by Ewa Murphy (Self) [phone] (QEAW)[phone] (Home)  [phone] (Home) Remove  - Left MessageCommunicated - Attempt 2    02/09/2023 09:57 AM EST by Ewa Brennan 02/09/2023 09:57 AM EST by Ewa Murphy (Self) [phone] (MRKHOV)[phone] (Mobile)  [phone] (Mobile) Remove      Personal communication with patient regarding recent ED visit on 2-5-23  Patient stated that he is doing about the same  Patient states he went to see his Chiropractor and he stated it may take a little time for patient to start feeling better  Patient states he was able to  medication prescribed for him in the ED and he has had some relief  Patient declines PCP follow-up at this time but states he will call PCP in a week if he is not better  Patient does not meet OPCM criteria

## 2023-03-20 ENCOUNTER — OFFICE VISIT (OUTPATIENT)
Dept: INTERNAL MEDICINE CLINIC | Age: 48
End: 2023-03-20

## 2023-03-20 VITALS
HEART RATE: 94 BPM | WEIGHT: 212.6 LBS | OXYGEN SATURATION: 98 % | TEMPERATURE: 97.9 F | BODY MASS INDEX: 29.76 KG/M2 | DIASTOLIC BLOOD PRESSURE: 72 MMHG | HEIGHT: 71 IN | SYSTOLIC BLOOD PRESSURE: 120 MMHG

## 2023-03-20 DIAGNOSIS — M54.16 LUMBAR BACK PAIN WITH RADICULOPATHY AFFECTING LEFT LOWER EXTREMITY: Primary | ICD-10-CM

## 2023-03-20 DIAGNOSIS — E78.00 HYPERCHOLESTEREMIA: ICD-10-CM

## 2023-03-20 DIAGNOSIS — E03.9 PRIMARY HYPOTHYROIDISM: ICD-10-CM

## 2023-03-20 RX ORDER — GABAPENTIN 300 MG/1
300 CAPSULE ORAL 2 TIMES DAILY
Qty: 60 CAPSULE | Refills: 1 | Status: SHIPPED | OUTPATIENT
Start: 2023-03-20

## 2023-03-20 NOTE — PROGRESS NOTES
Assessment/Plan:    Lumbar back pain with radiculopathy affecting left lower extremity  Patient tried 6 weeks of chiropractic treatment without any significant improvement  Also took Medrol Dosepak with some improvement in the beginning  Will request MRI for further evaluation of pressure over nerve root on the left side and also evaluation of spinal stenosis  Will prescribe gabapentin 300 mg p o  at bedtime for 3 nights then twice a day  Can take over-the-counter 2 Advil 2-3 times a day as needed for next week  Follow-up after MRI  Primary hypothyroidism  Continue with levothyroxine       Diagnoses and all orders for this visit:    Lumbar back pain with radiculopathy affecting left lower extremity  -     gabapentin (Neurontin) 300 mg capsule; Take 1 capsule (300 mg total) by mouth 2 (two) times a day  -     MRI lumbar spine wo contrast; Future    Hypercholesteremia    Primary hypothyroidism               Subjective:          Patient ID: Jesse Miramontes is a 49 y o  male  Patient came to office with a complaint of lower back pain with radiation of numbness and pain to left lower extremity  It started about 6 weeks ago  He was seen in the emergency room at that time was prescribed Medrol Dosepak along with NSAIDs and also started chiropractic treatment  As per patient his chiropractor told him he cannot help him anymore and there was no significant improvement after initial improvement for a week or so  Still complaining of weakness of left lower extremity and numbness below the knee and also some tingling/discomfort in left thigh area  Denied any urinary symptoms  The following portions of the patient's history were reviewed and updated as appropriate: allergies, current medications, past family history, past medical history, past social history, past surgical history and problem list     Review of Systems   Constitutional: Negative for fatigue and fever     HENT: Negative for congestion, ear discharge, ear pain, postnasal drip, sinus pressure, sore throat, tinnitus and trouble swallowing  Eyes: Negative for discharge, itching and visual disturbance  Respiratory: Negative for cough and shortness of breath  Cardiovascular: Negative for chest pain and palpitations  Gastrointestinal: Negative for abdominal pain, diarrhea, nausea and vomiting  Endocrine: Negative for cold intolerance and polyuria  Genitourinary: Negative for difficulty urinating, dysuria and urgency  Musculoskeletal: Positive for back pain  Negative for arthralgias and neck pain  Also complaining of weakness of left lower extremity   Skin: Negative for rash  Allergic/Immunologic: Negative for environmental allergies  Neurological: Positive for numbness  Negative for dizziness, weakness and headaches  Numbness of left thigh area and worse from knee to ankle area on the left side  Psychiatric/Behavioral: Negative for agitation and behavioral problems  The patient is not nervous/anxious            Past Medical History:   Diagnosis Date   • Disease of thyroid gland    • Gastroesophageal reflux disease 10/29/2020   • GERD (gastroesophageal reflux disease)    • Kidney stone     R side- for laser/stent today 10/29/2020   • Smoking 10/29/2020   • Wears contact lenses          Current Outpatient Medications:   •  Cholecalciferol (VITAMIN D3 PO), Take by mouth in the morning, Disp: , Rfl:   •  gabapentin (Neurontin) 300 mg capsule, Take 1 capsule (300 mg total) by mouth 2 (two) times a day, Disp: 60 capsule, Rfl: 1  •  Krill Oil 500 MG CAPS, Take 500 mg by mouth daily, Disp: , Rfl:   •  levothyroxine 137 mcg tablet, Take 1 tablet (137 mcg total) by mouth daily, Disp: 90 tablet, Rfl: 1  •  MAGNESIUM PO, Take by mouth daily 250 mg daily , Disp: , Rfl:   •  methylPREDNISolone 4 MG tablet therapy pack, Use as directed on package (Patient not taking: Reported on 3/20/2023), Disp: 21 tablet, Rfl: 0    No Known Allergies    Social History   Past Surgical History:   Procedure Laterality Date   • COLONOSCOPY     • [place] RETROGRADE PYELOGRAM  10/29/2020   • KIDNEY STONE SURGERY  2015   • [place] CYSTO/URETERO W/LITHOTRIPSY &INDWELL STENT INSRT Right 10/29/2020    Procedure: CYSTO, URETEROSCOPY W/LASER lithotripsy, RETROGRADE PYELOGRAM, stone basket extraction, insertion double J STENT;  Surgeon: Climmie Lombard, MD;  Location: [facility];  Service: Urology   • [place] LITHOTRIPSY [address] Right 7/17/2020    Procedure: LITHOTRIPSY EXTRACORPORAL SHOCKWAVE (ESWL); Surgeon: Climmie Lombard, MD;  Location[facility];  Service: Urology     Family History   Problem Relation Age of Onset   • No Known Problems Mother    • Cancer Father         unknown   • Thyroid disease Father    • Colon cancer Maternal Grandfather    • Completed Suicide  Brother        Objective:  /72 (BP Location: Left arm, Patient Position: Sitting, Cuff Size: Standard)   Pulse 94   Temp 97 9 °F (36 6 °C) (Temporal)   Ht 5' 11" (1 803 m)   Wt 96 4 kg (212 lb 9 6 oz)   SpO2 98%   BMI 29 65 kg/m²   Body mass index is 29 65 kg/m²  Physical Exam  Constitutional:       Appearance: He is well-developed  He is not ill-appearing or diaphoretic  HENT:      Head: Normocephalic  Right Ear: External ear normal       Left Ear: External ear normal       Nose: No rhinorrhea  Mouth/Throat:      Pharynx: No posterior oropharyngeal erythema  Eyes:      General: No scleral icterus  Pupils: Pupils are equal, round, and reactive to light  Neck:      Thyroid: No thyromegaly  Trachea: No tracheal deviation  Cardiovascular:      Rate and Rhythm: Normal rate and regular rhythm  Heart sounds: Normal heart sounds  Pulmonary:      Effort: Pulmonary effort is normal  No respiratory distress  Breath sounds: Normal breath sounds  Chest:      Chest wall: No tenderness     Abdominal:      General: Bowel sounds are normal       Palpations: Abdomen is soft  There is no mass  Tenderness: There is no abdominal tenderness  Musculoskeletal:         General: Normal range of motion  Cervical back: Normal range of motion and neck supple  Right lower leg: No edema  Left lower leg: No edema  Lymphadenopathy:      Cervical: No cervical adenopathy  Skin:     General: Skin is warm  Neurological:      Mental Status: He is alert and oriented to person, place, and time  Cranial Nerves: No cranial nerve deficit  Sensory: Sensory deficit present  Motor: Weakness present        Coordination: Coordination abnormal       Gait: Gait abnormal       Deep Tendon Reflexes: Reflexes abnormal       Comments: Deep tendon reflexes on left knee is decreased 2+ as compared to right knee  Power in left lower extremity is 4 x 5 as compared to 5 x 5 on right lower extremity  Straight leg raising test on the left is positive  Decreased sensation on left lower extremity noted     Psychiatric:         Mood and Affect: Mood normal          Behavior: Behavior normal

## 2023-03-20 NOTE — ASSESSMENT & PLAN NOTE
Patient tried 6 weeks of chiropractic treatment without any significant improvement  Also took Medrol Dosepak with some improvement in the beginning  Will request MRI for further evaluation of pressure over nerve root on the left side and also evaluation of spinal stenosis  Will prescribe gabapentin 300 mg p o  at bedtime for 3 nights then twice a day  Can take over-the-counter 2 Advil 2-3 times a day as needed for next week  Follow-up after MRI

## 2023-04-03 ENCOUNTER — HOSPITAL ENCOUNTER (OUTPATIENT)
Dept: RADIOLOGY | Age: 48
Discharge: HOME | End: 2023-04-03

## 2023-04-03 DIAGNOSIS — M54.16 LUMBAR BACK PAIN WITH RADICULOPATHY AFFECTING LEFT LOWER EXTREMITY: ICD-10-CM

## 2023-04-04 ENCOUNTER — OFFICE VISIT (OUTPATIENT)
Dept: INTERNAL MEDICINE CLINIC | Facility: OTHER | Age: 48
End: 2023-04-04

## 2023-04-04 VITALS
WEIGHT: 214 LBS | HEIGHT: 71 IN | DIASTOLIC BLOOD PRESSURE: 80 MMHG | HEART RATE: 74 BPM | SYSTOLIC BLOOD PRESSURE: 128 MMHG | OXYGEN SATURATION: 98 % | TEMPERATURE: 98.2 F | BODY MASS INDEX: 29.96 KG/M2

## 2023-04-04 DIAGNOSIS — M54.16 LUMBAR BACK PAIN WITH RADICULOPATHY AFFECTING LEFT LOWER EXTREMITY: Primary | ICD-10-CM

## 2023-04-04 DIAGNOSIS — E03.9 PRIMARY HYPOTHYROIDISM: ICD-10-CM

## 2023-04-04 RX ORDER — GABAPENTIN 300 MG/1
300 CAPSULE ORAL 3 TIMES DAILY
Qty: 90 CAPSULE | Refills: 1 | Status: SHIPPED | OUTPATIENT
Start: 2023-04-04

## 2023-04-04 NOTE — PROGRESS NOTES
Assessment/Plan:    Primary hypothyroidism  Continue with present dose of levothyroxine  He is due for his TSH in about 4-month    Lumbar back pain with radiculopathy affecting left lower extremity  Still with significant weakness/numbness and pain in left lower extremity  MRI done yesterday results is pending  Will refer patient to pain management for possible consideration of epidural injection depending upon MRI result  Continue with Tylenol/Advil as needed  Will increase gabapentin 300 mg p o  3 times daily  Depending upon MRI results patient may need neurosurgery evaluation  Diagnoses and all orders for this visit:    Lumbar back pain with radiculopathy affecting left lower extremity  -     Ambulatory Referral to Pain Management; Future  -     gabapentin (Neurontin) 300 mg capsule; Take 1 capsule (300 mg total) by mouth 3 (three) times a day    Primary hypothyroidism          BMI Counseling: Body mass index is 29 85 kg/m²  The BMI is above normal  Nutrition recommendations include decreasing portion sizes, encouraging healthy choices of fruits and vegetables, decreasing fast food intake, consuming healthier snacks and limiting drinks that contain sugar  Exercise recommendations include moderate physical activity 150 minutes/week and exercising 3-5 times per week  Rationale for BMI follow-up plan is due to patient being overweight or obese  Subjective:          Patient ID: Oxana Celestin is a 49 y o  male  Patient still complaining of lower back pain with radiation to left leg and also feeling weakness of the left leg  Does have MRI done yesterday and report is pending  No urinary symptoms        The following portions of the patient's history were reviewed and updated as appropriate: allergies, current medications, past family history, past medical history, past social history, past surgical history and problem list     Review of Systems   Constitutional: Negative for fatigue and fever    HENT: Negative for congestion, ear discharge, ear pain, postnasal drip, sinus pressure, sore throat, tinnitus and trouble swallowing  Eyes: Negative for discharge, itching and visual disturbance  Respiratory: Negative for cough and shortness of breath  Cardiovascular: Negative for chest pain and palpitations  Gastrointestinal: Negative for abdominal pain, diarrhea, nausea and vomiting  Endocrine: Negative for cold intolerance and polyuria  Genitourinary: Negative for difficulty urinating, dysuria and urgency  Musculoskeletal: Positive for back pain and gait problem  Negative for arthralgias and neck pain  Skin: Negative for rash  Allergic/Immunologic: Negative for environmental allergies  Neurological: Positive for numbness  Negative for dizziness, weakness and headaches  Psychiatric/Behavioral: Negative for agitation and behavioral problems  The patient is not nervous/anxious            Past Medical History:   Diagnosis Date   • Disease of thyroid gland    • Gastroesophageal reflux disease 10/29/2020   • GERD (gastroesophageal reflux disease)    • Kidney stone     R side- for laser/stent today 10/29/2020   • Smoking 10/29/2020   • Wears contact lenses          Current Outpatient Medications:   •  Cholecalciferol (VITAMIN D3 PO), Take by mouth in the morning, Disp: , Rfl:   •  gabapentin (Neurontin) 300 mg capsule, Take 1 capsule (300 mg total) by mouth 3 (three) times a day, Disp: 90 capsule, Rfl: 1  •  Krill Oil 500 MG CAPS, Take 500 mg by mouth daily, Disp: , Rfl:   •  levothyroxine 137 mcg tablet, Take 1 tablet (137 mcg total) by mouth daily, Disp: 90 tablet, Rfl: 1  •  MAGNESIUM PO, Take by mouth daily 250 mg daily , Disp: , Rfl:   •  methylPREDNISolone 4 MG tablet therapy pack, Use as directed on package (Patient not taking: Reported on 3/20/2023), Disp: 21 tablet, Rfl: 0    No Known Allergies    Social History   Past Surgical History:   Procedure Laterality Date   • COLONOSCOPY "    • [place] RETROGRADE PYELOGRAM  10/29/2020   • KIDNEY STONE SURGERY  2015   • [place] CYSTO/URETERO W/LITHOTRIPSY &INDWELL STENT INSRT Right 10/29/2020    Procedure: CYSTO, URETEROSCOPY W/LASER lithotripsy, RETROGRADE PYELOGRAM, stone basket extraction, insertion double J STENT;  Surgeon: Mauro Hill MD;  Location: [facility];  Service: Urology   • [place] LITHOTRIPSY [address] Right 7/17/2020    Procedure: LITHOTRIPSY EXTRACORPORAL SHOCKWAVE (ESWL); Surgeon: Mauro Hill MD;  [facility];  Service: Urology     Family History   Problem Relation Age of Onset   • No Known Problems Mother    • Cancer Father         unknown   • Thyroid disease Father    • Colon cancer Maternal Grandfather    • Completed Suicide  Brother        Objective:  /80 (BP Location: Left arm, Patient Position: Sitting, Cuff Size: Adult)   Pulse 74   Temp 98 2 °F (36 8 °C) (Temporal)   Ht 5' 11\" (1 803 m)   Wt 97 1 kg (214 lb)   SpO2 98%   BMI 29 85 kg/m²   Body mass index is 29 85 kg/m²  Physical Exam  Constitutional:       Appearance: He is well-developed  He is not toxic-appearing or diaphoretic  HENT:      Head: Normocephalic  Right Ear: External ear normal  There is no impacted cerumen  Left Ear: External ear normal  There is no impacted cerumen  Nose: No rhinorrhea  Mouth/Throat:      Pharynx: No posterior oropharyngeal erythema  Eyes:      General: No scleral icterus  Pupils: Pupils are equal, round, and reactive to light  Neck:      Thyroid: No thyromegaly  Trachea: No tracheal deviation  Cardiovascular:      Rate and Rhythm: Normal rate and regular rhythm  Heart sounds: Normal heart sounds  Pulmonary:      Effort: Pulmonary effort is normal  No respiratory distress  Breath sounds: Normal breath sounds  Chest:      Chest wall: No tenderness  Abdominal:      General: Bowel sounds are normal       Palpations: Abdomen is soft  There is no mass        " Tenderness: There is no abdominal tenderness  Musculoskeletal:         General: Tenderness present  Normal range of motion  Cervical back: Normal range of motion and neck supple  Comments: Mild tenderness over lower lumbar area present   Lymphadenopathy:      Cervical: No cervical adenopathy  Skin:     General: Skin is warm  Findings: No lesion or rash  Neurological:      Mental Status: He is alert and oriented to person, place, and time  Cranial Nerves: No cranial nerve deficit  Coordination: Coordination abnormal       Gait: Gait abnormal       Deep Tendon Reflexes: Reflexes abnormal       Comments: Patient with mild limp of left leg  However he left lower extremity is 4/5 as compared to right which is 5/5  Deep tendon reflexes also decreased at knee and ankle level

## 2023-04-04 NOTE — ASSESSMENT & PLAN NOTE
Still with significant weakness/numbness and pain in left lower extremity  MRI done yesterday results is pending  Will refer patient to pain management for possible consideration of epidural injection depending upon MRI result  Continue with Tylenol/Advil as needed  Will increase gabapentin 300 mg p o  3 times daily  Depending upon MRI results patient may need neurosurgery evaluation

## 2023-04-06 ENCOUNTER — TELEPHONE (OUTPATIENT)
Dept: INTERNAL MEDICINE CLINIC | Facility: OTHER | Age: 48
End: 2023-04-06

## 2023-04-06 DIAGNOSIS — M54.16 LUMBAR BACK PAIN WITH RADICULOPATHY AFFECTING LEFT LOWER EXTREMITY: Primary | ICD-10-CM

## 2023-04-24 ENCOUNTER — TELEPHONE (OUTPATIENT)
Dept: NEUROSURGERY | Facility: CLINIC | Age: 48
End: 2023-04-24

## 2023-04-24 NOTE — TELEPHONE ENCOUNTER
FMLA/ Disability Forms filled out, faxed to [employer] on 4/24/23. I also mailed a copy to patient.

## 2023-05-02 ENCOUNTER — CONSULT (OUTPATIENT)
Dept: INTERNAL MEDICINE CLINIC | Facility: OTHER | Age: 48
End: 2023-05-02

## 2023-05-02 VITALS
WEIGHT: 214 LBS | OXYGEN SATURATION: 98 % | TEMPERATURE: 98 F | HEART RATE: 94 BPM | HEIGHT: 71 IN | SYSTOLIC BLOOD PRESSURE: 118 MMHG | BODY MASS INDEX: 29.96 KG/M2 | DIASTOLIC BLOOD PRESSURE: 70 MMHG

## 2023-05-02 DIAGNOSIS — E03.9 PRIMARY HYPOTHYROIDISM: ICD-10-CM

## 2023-05-02 DIAGNOSIS — Z01.818 PREOPERATIVE CLEARANCE: Primary | ICD-10-CM

## 2023-05-02 DIAGNOSIS — Z01.818 PRE-OP EXAMINATION: ICD-10-CM

## 2023-05-02 DIAGNOSIS — M54.16 LUMBAR BACK PAIN WITH RADICULOPATHY AFFECTING LEFT LOWER EXTREMITY: ICD-10-CM

## 2023-05-02 NOTE — PROGRESS NOTES
INTERNAL MEDICINE OFFICE VISIT  [facility]  [address] [place] [address]    NAME: Kary Adam  AGE: 49 y o  SEX: male    DATE OF ENCOUNTER: 5/2/2023    Assessment and Plan     1  Preoperative clearance  2  Pre-op examination  3  Lumbar back pain with radiculopathy affecting left lower extremity    Planned Left L3/4 and Left L4/5 hemilaminotomy, foraminotomy, discectomy (Left: Spine Lumbar) with Neurosurgery , Dr Joel Diamond 05/12/2023     Kary Adam is seen for pre-operative evaluation  Patient reports no sx of chest pain reported at rest or with exertion, dyspnea at rest or with exertion, PND, LE edema, claudication, or palpitations  No history of ischemic heart disease, CHF, cardiovascular disease, diabetes  No recent anticoagulant or antithrombotic use  Patient reports no history of stress test, cardiac cath, or coronary revascularization)  The patient is able to achieve >4 METs of activity during walking/jogging/taking up stair which were only limited lately due to back pain  Pt's RCRI is 0 points , correlating with Class I risk, which carries a 3 9 percent 30 day risk of death, MI, or cardiac arrest  Patient's other comorbid conditions include Currently Every day smoker 1 pack/day  There is no need for further diagnostic testing prior to patient's scheduled procedure  Patient may proceed with surgery with understanding of perioperative risk in light of the benefits of possible surgery       EKG 04/13/2023 NSR @ 68 , laura  Old Echo 2014 done due to fatigue at that time, was normal study except trace Regurgitation of Pulmonary Valve   Recent Blood work this month , overall unremarkable CBC and BMP     No orders of the defined types were placed in this encounter       - Counseling Documentation: patient was counseled regarding: diagnostic results, instructions for management, risk factor reductions and risks and benefits of treatment options    Chief "Complaint     Chief Complaint   Patient presents with    Pre-op Exam     Pre op neurosurgery 5/12 Dr Nav Aly  Patient had blood wk done and EKG       History of Present Illness     Brandi SERRANO    The following portions of the patient's history were reviewed and updated as appropriate: allergies, current medications, past family history, past medical history, past social history, past surgical history and problem list     Review of Systems     Review of Systems  - GENERAL: Negative for any nausea, vomiting, fevers, chills, or weight loss  - HEENT: Negative for any head/Neck trauma, pain, double/blurry vision, sinusitis, rhinitis, nose bleeding   - CARDIAC: Negative for any chest pain, palpitation, Dyspnea on exertion, peripheral edema  - PULMONARY: Negative for any SOB, cough, wheezing    - GASTROINTESTINAL: Negative for any abdominal pain, N/V/D/C, blood in stool    - GENITOURINARY: Negative for any dysuria, hematuria, incontinence   - NEUROLOGIC: Negative for any muscle weakness, numbness/tingling, memory changes  - MUSCULOSKELETAL: positive for lower back pain with radiculopathy to LLE ; otherwise Negative for any other joint pains/swelling, limited ROM  - INTEGUMENTARY: Negative for any rashes, cuts/ lesions   - HEMATOLOGIC: Negative for any abnormal bruising, frequent infections or bleeding      Active Problem List     Patient Active Problem List   Diagnosis    Renal calculus, right    Primary hypothyroidism    Smoking    Hypercholesteremia    Influenza vaccination declined    COVID-19 vaccination not done    COVID-19    Lumbar back pain with radiculopathy affecting left lower extremity    Pre-op examination       Objective     /70 (BP Location: Left arm, Patient Position: Sitting, Cuff Size: Large)   Pulse 94   Temp 98 °F (36 7 °C) (Temporal)   Ht 5' 11\" (1 803 m)   Wt 97 1 kg (214 lb)   SpO2 98%   BMI 29 85 kg/m²     Physical Exam  - GEN: Appears well, alert and oriented " x 3, pleasant and cooperative, in no acute distress  - HEENT: Anicteric, mucous membranes moist, PERRL and EOMI   - NECK: No lymphadenopathy, JVD or carotid bruits   - HEART: RRR, normal S1 and S2, no murmurs, clicks, gallops or rubs   - LUNGS: Clear to auscultation bilaterally; no wheezes, rales, or rhonchi  - ABDOMEN: Normal bowel sounds, soft, no tenderness, no distention, no organomegaly or masses felt on exam    - EXTREMITIES: Peripheral pulses normal; no clubbing, cyanosis, or edema  - NEURO: No focal findings, CN II-XII are grossly intact  - Musculoskeletal: 5/5 strength, normal ROM, no swollen or erythematous joints     - SKIN: Normal without suspicious lesions on exposed skin    Pertinent Laboratory/Diagnostic Studies:  CBC:   Lab Results   Component Value Date/Time    WBC 5 28 04/13/2023 12:12 PM    WBC 4 60 08/14/2015 02:28 PM    RBC 5 27 04/13/2023 12:12 PM    RBC 4 82 08/14/2015 02:28 PM    HGB 15 4 04/13/2023 12:12 PM    HGB 13 8 08/14/2015 02:28 PM    HCT 46 8 04/13/2023 12:12 PM    HCT 41 6 08/14/2015 02:28 PM    MCV 89 04/13/2023 12:12 PM    MCV 86 08/14/2015 02:28 PM    MCH 29 2 04/13/2023 12:12 PM    MCH 28 6 08/14/2015 02:28 PM    MCHC 32 9 04/13/2023 12:12 PM    MCHC 33 2 08/14/2015 02:28 PM    RDW 13 1 04/13/2023 12:12 PM    RDW 13 0 08/14/2015 02:28 PM    MPV 10 2 04/13/2023 12:12 PM    MPV 11 2 08/14/2015 02:28 PM     04/13/2023 12:12 PM     08/14/2015 02:28 PM    NRBC 0 04/13/2023 12:12 PM    NEUTOPHILPCT 70 04/13/2023 12:12 PM    NEUTOPHILPCT 70 08/14/2015 02:28 PM    LYMPHOPCT 19 04/13/2023 12:12 PM    LYMPHOPCT 21 08/14/2015 02:28 PM    MONOPCT 7 04/13/2023 12:12 PM    MONOPCT 7 08/14/2015 02:28 PM    EOSPCT 3 04/13/2023 12:12 PM    EOSPCT 2 08/14/2015 02:28 PM    BASOPCT 1 04/13/2023 12:12 PM    BASOPCT 0 05/13/2014 02:15 PM    NEUTROABS 3 72 04/13/2023 12:12 PM    NEUTROABS 3 22 08/14/2015 02:28 PM    LYMPHSABS 1 00 04/13/2023 12:12 PM    LYMPHSABS 0 97 08/14/2015 02:28 PM    MONOSABS 0 36 04/13/2023 12:12 PM    MONOSABS 0 32 08/14/2015 02:28 PM    EOSABS 0 15 04/13/2023 12:12 PM    EOSABS 0 09 08/14/2015 02:28 PM     Chemistry Profile:   Lab Results   Component Value Date/Time     08/14/2015 02:28 PM    K 4 4 04/13/2023 12:12 PM    K 4 0 08/14/2015 02:28 PM     04/13/2023 12:12 PM     08/14/2015 02:28 PM    CO2 29 04/13/2023 12:12 PM    CO2 27 08/14/2015 02:28 PM    ANIONGAP 4 08/14/2015 02:28 PM    BUN 14 04/13/2023 12:12 PM    BUN 13 08/14/2015 02:28 PM    CREATININE 0 86 04/13/2023 12:12 PM    CREATININE 0 87 08/14/2015 02:28 PM    GLUC 90 04/13/2023 12:12 PM    GLUF 85 12/17/2021 05:02 PM    GLUCOSE 91 08/14/2015 02:28 PM    CALCIUM 9 9 04/13/2023 12:12 PM    CALCIUM 9 4 08/14/2015 02:28 PM    MG 2 4 01/23/2021 07:14 AM    PHOS 3 1 01/23/2021 07:14 AM    AST 18 04/13/2023 12:12 PM    AST 19 08/14/2015 02:28 PM    ALT 19 04/13/2023 12:12 PM    ALT 34 08/14/2015 02:28 PM    ALKPHOS 61 04/13/2023 12:12 PM    ALKPHOS 83 08/14/2015 02:28 PM    PROT 7 0 08/14/2015 02:28 PM    BILITOT 0 22 08/14/2015 02:28 PM    EGFR 102 04/13/2023 12:12 PM     Coagulation Studies:   Lab Results   Component Value Date/Time    PROTIME 12 6 04/13/2023 12:12 PM    INR 0 93 04/13/2023 12:12 PM    PTT 29 04/13/2023 12:12 PM     Cardiac Studies: No results found for: NTBNP, BNP, TROPONINI, POCTROP      Current Medications     Current Outpatient Medications:     Cholecalciferol (VITAMIN D3 PO), Take by mouth in the morning, Disp: , Rfl:     gabapentin (Neurontin) 300 mg capsule, Take 1 capsule (300 mg total) by mouth 3 (three) times a day, Disp: 90 capsule, Rfl: 1    Krill Oil 500 MG CAPS, Take 500 mg by mouth daily, Disp: , Rfl:     levothyroxine 137 mcg tablet, Take 1 tablet (137 mcg total) by mouth daily, Disp: 90 tablet, Rfl: 1    MAGNESIUM PO, Take by mouth daily 250 mg daily , Disp: , Rfl:     Health Maintenance     Health Maintenance   Topic Date Due    COVID-19 Vaccine (1) Never done    Pneumococcal Vaccine: Pediatrics (0 to 5 Years) and At-Risk Patients (6 to 59 Years) (1 - PCV) Never done    HIV Screening  Never done    Annual Physical  Never done    DTaP,Tdap,and Td Vaccines (1 - Tdap) 12/19/2020    Influenza Vaccine (Season Ended) 09/01/2023    BMI: Followup Plan  04/04/2024    Depression Screening  05/02/2024    BMI: Adult  05/02/2024    Colorectal Cancer Screening  03/08/2028    Hepatitis C Screening  Completed    HIB Vaccine  Aged Out    IPV Vaccine  Aged Out    Hepatitis A Vaccine  Aged Out    Meningococcal ACWY Vaccine  Aged Out    HPV Vaccine  Aged Out     Immunization History   Administered Date(s) Administered    Td (adult), adsorbed 12/18/2020       Gregoria Fermin DO   [place] [address]    5/2/2023 4:35 PM

## 2023-05-02 NOTE — H&P (VIEW-ONLY)
INTERNAL MEDICINE OFFICE VISIT  [facility]  [address] [facility]    NAME: Thien Acevedo  AGE: 49 y o  SEX: male    DATE OF ENCOUNTER: 5/2/2023    Assessment and Plan     1  Preoperative clearance  2  Pre-op examination  3  Lumbar back pain with radiculopathy affecting left lower extremity    Planned Left L3/4 and Left L4/5 hemilaminotomy, foraminotomy, discectomy (Left: Spine Lumbar) with Neurosurgery , Dr Joselyn Felder 05/12/2023     Thien Acevedo is seen for pre-operative evaluation  Patient reports no sx of chest pain reported at rest or with exertion, dyspnea at rest or with exertion, PND, LE edema, claudication, or palpitations  No history of ischemic heart disease, CHF, cardiovascular disease, diabetes  No recent anticoagulant or antithrombotic use  Patient reports no history of stress test, cardiac cath, or coronary revascularization)  The patient is able to achieve >4 METs of activity during walking/jogging/taking up stair which were only limited lately due to back pain  Pt's RCRI is 0 points , correlating with Class I risk, which carries a 3 9 percent 30 day risk of death, MI, or cardiac arrest  Patient's other comorbid conditions include Currently Every day smoker 1 pack/day  There is no need for further diagnostic testing prior to patient's scheduled procedure  Patient may proceed with surgery with understanding of perioperative risk in light of the benefits of possible surgery       EKG 04/13/2023 NSR @ 68 , laura  Old Echo 2014 done due to fatigue at that time, was normal study except trace Regurgitation of Pulmonary Valve   Recent Blood work this month , overall unremarkable CBC and BMP     No orders of the defined types were placed in this encounter       - Counseling Documentation: patient was counseled regarding: diagnostic results, instructions for management, risk factor reductions and risks and benefits of treatment options    Chief "Complaint     Chief Complaint   Patient presents with   • Pre-op Exam     Pre op neurosurgery 5/12 Dr Ruthie Cabrales  Patient had blood wk done and EKG       History of Present Illness     Idania SERRANO    The following portions of the patient's history were reviewed and updated as appropriate: allergies, current medications, past family history, past medical history, past social history, past surgical history and problem list     Review of Systems     Review of Systems  - GENERAL: Negative for any nausea, vomiting, fevers, chills, or weight loss  - HEENT: Negative for any head/Neck trauma, pain, double/blurry vision, sinusitis, rhinitis, nose bleeding   - CARDIAC: Negative for any chest pain, palpitation, Dyspnea on exertion, peripheral edema  - PULMONARY: Negative for any SOB, cough, wheezing    - GASTROINTESTINAL: Negative for any abdominal pain, N/V/D/C, blood in stool    - GENITOURINARY: Negative for any dysuria, hematuria, incontinence   - NEUROLOGIC: Negative for any muscle weakness, numbness/tingling, memory changes  - MUSCULOSKELETAL: positive for lower back pain with radiculopathy to LLE ; otherwise Negative for any other joint pains/swelling, limited ROM  - INTEGUMENTARY: Negative for any rashes, cuts/ lesions   - HEMATOLOGIC: Negative for any abnormal bruising, frequent infections or bleeding      Active Problem List     Patient Active Problem List   Diagnosis   • Renal calculus, right   • Primary hypothyroidism   • Smoking   • Hypercholesteremia   • Influenza vaccination declined   • COVID-19 vaccination not done   • COVID-19   • Lumbar back pain with radiculopathy affecting left lower extremity   • Pre-op examination       Objective     /70 (BP Location: Left arm, Patient Position: Sitting, Cuff Size: Large)   Pulse 94   Temp 98 °F (36 7 °C) (Temporal)   Ht 5' 11\" (1 803 m)   Wt 97 1 kg (214 lb)   SpO2 98%   BMI 29 85 kg/m²     Physical Exam  - GEN: Appears well, alert and oriented " x 3, pleasant and cooperative, in no acute distress  - HEENT: Anicteric, mucous membranes moist, PERRL and EOMI   - NECK: No lymphadenopathy, JVD or carotid bruits   - HEART: RRR, normal S1 and S2, no murmurs, clicks, gallops or rubs   - LUNGS: Clear to auscultation bilaterally; no wheezes, rales, or rhonchi  - ABDOMEN: Normal bowel sounds, soft, no tenderness, no distention, no organomegaly or masses felt on exam    - EXTREMITIES: Peripheral pulses normal; no clubbing, cyanosis, or edema  - NEURO: No focal findings, CN II-XII are grossly intact  - Musculoskeletal: 5/5 strength, normal ROM, no swollen or erythematous joints     - SKIN: Normal without suspicious lesions on exposed skin    Pertinent Laboratory/Diagnostic Studies:  CBC:   Lab Results   Component Value Date/Time    WBC 5 28 04/13/2023 12:12 PM    WBC 4 60 08/14/2015 02:28 PM    RBC 5 27 04/13/2023 12:12 PM    RBC 4 82 08/14/2015 02:28 PM    HGB 15 4 04/13/2023 12:12 PM    HGB 13 8 08/14/2015 02:28 PM    HCT 46 8 04/13/2023 12:12 PM    HCT 41 6 08/14/2015 02:28 PM    MCV 89 04/13/2023 12:12 PM    MCV 86 08/14/2015 02:28 PM    MCH 29 2 04/13/2023 12:12 PM    MCH 28 6 08/14/2015 02:28 PM    MCHC 32 9 04/13/2023 12:12 PM    MCHC 33 2 08/14/2015 02:28 PM    RDW 13 1 04/13/2023 12:12 PM    RDW 13 0 08/14/2015 02:28 PM    MPV 10 2 04/13/2023 12:12 PM    MPV 11 2 08/14/2015 02:28 PM     04/13/2023 12:12 PM     08/14/2015 02:28 PM    NRBC 0 04/13/2023 12:12 PM    NEUTOPHILPCT 70 04/13/2023 12:12 PM    NEUTOPHILPCT 70 08/14/2015 02:28 PM    LYMPHOPCT 19 04/13/2023 12:12 PM    LYMPHOPCT 21 08/14/2015 02:28 PM    MONOPCT 7 04/13/2023 12:12 PM    MONOPCT 7 08/14/2015 02:28 PM    EOSPCT 3 04/13/2023 12:12 PM    EOSPCT 2 08/14/2015 02:28 PM    BASOPCT 1 04/13/2023 12:12 PM    BASOPCT 0 05/13/2014 02:15 PM    NEUTROABS 3 72 04/13/2023 12:12 PM    NEUTROABS 3 22 08/14/2015 02:28 PM    LYMPHSABS 1 00 04/13/2023 12:12 PM    LYMPHSABS 0 97 08/14/2015 02:28 PM    MONOSABS 0 36 04/13/2023 12:12 PM    MONOSABS 0 32 08/14/2015 02:28 PM    EOSABS 0 15 04/13/2023 12:12 PM    EOSABS 0 09 08/14/2015 02:28 PM     Chemistry Profile:   Lab Results   Component Value Date/Time     08/14/2015 02:28 PM    K 4 4 04/13/2023 12:12 PM    K 4 0 08/14/2015 02:28 PM     04/13/2023 12:12 PM     08/14/2015 02:28 PM    CO2 29 04/13/2023 12:12 PM    CO2 27 08/14/2015 02:28 PM    ANIONGAP 4 08/14/2015 02:28 PM    BUN 14 04/13/2023 12:12 PM    BUN 13 08/14/2015 02:28 PM    CREATININE 0 86 04/13/2023 12:12 PM    CREATININE 0 87 08/14/2015 02:28 PM    GLUC 90 04/13/2023 12:12 PM    GLUF 85 12/17/2021 05:02 PM    GLUCOSE 91 08/14/2015 02:28 PM    CALCIUM 9 9 04/13/2023 12:12 PM    CALCIUM 9 4 08/14/2015 02:28 PM    MG 2 4 01/23/2021 07:14 AM    PHOS 3 1 01/23/2021 07:14 AM    AST 18 04/13/2023 12:12 PM    AST 19 08/14/2015 02:28 PM    ALT 19 04/13/2023 12:12 PM    ALT 34 08/14/2015 02:28 PM    ALKPHOS 61 04/13/2023 12:12 PM    ALKPHOS 83 08/14/2015 02:28 PM    PROT 7 0 08/14/2015 02:28 PM    BILITOT 0 22 08/14/2015 02:28 PM    EGFR 102 04/13/2023 12:12 PM     Coagulation Studies:   Lab Results   Component Value Date/Time    PROTIME 12 6 04/13/2023 12:12 PM    INR 0 93 04/13/2023 12:12 PM    PTT 29 04/13/2023 12:12 PM     Cardiac Studies: No results found for: NTBNP, BNP, TROPONINI, POCTROP      Current Medications     Current Outpatient Medications:   •  Cholecalciferol (VITAMIN D3 PO), Take by mouth in the morning, Disp: , Rfl:   •  gabapentin (Neurontin) 300 mg capsule, Take 1 capsule (300 mg total) by mouth 3 (three) times a day, Disp: 90 capsule, Rfl: 1  •  Krill Oil 500 MG CAPS, Take 500 mg by mouth daily, Disp: , Rfl:   •  levothyroxine 137 mcg tablet, Take 1 tablet (137 mcg total) by mouth daily, Disp: 90 tablet, Rfl: 1  •  MAGNESIUM PO, Take by mouth daily 250 mg daily , Disp: , Rfl:     Health Maintenance     Health Maintenance   Topic Date Due   • COVID-19 Vaccine (1) Never done   • Pneumococcal Vaccine: Pediatrics (0 to 5 Years) and At-Risk Patients (6 to 59 Years) (1 - PCV) Never done   • HIV Screening  Never done   • Annual Physical  Never done   • DTaP,Tdap,and Td Vaccines (1 - Tdap) 12/19/2020   • Influenza Vaccine (Season Ended) 09/01/2023   • BMI: Followup Plan  04/04/2024   • Depression Screening  05/02/2024   • BMI: Adult  05/02/2024   • Colorectal Cancer Screening  03/08/2028   • Hepatitis C Screening  Completed   • HIB Vaccine  Aged Out   • IPV Vaccine  Aged Out   • Hepatitis A Vaccine  Aged Out   • Meningococcal ACWY Vaccine  Aged Out   • HPV Vaccine  Aged Out     Immunization History   Administered Date(s) Administered   • Td (adult), adsorbed 12/18/2020       DO Henry Lynch [address] [place] [address] Road    5/2/2023 4:35 PM

## 2023-05-09 NOTE — PRE-PROCEDURE INSTRUCTIONS
Pre-Surgery Instructions:   Medication Instructions   • Cholecalciferol (VITAMIN D3 PO) Stop taking 7 days prior to surgery  • gabapentin (Neurontin) 300 mg capsule PRN   • Krill Oil 500 MG CAPS Stop taking 7 days prior to surgery  • levothyroxine 137 mcg tablet Take day of surgery  • MAGNESIUM PO Stop taking 7 days prior to surgery  Medication instructions for day surgery reviewed  Please use only a sip of water to take your instructed medications  Avoid all over the counter vitamins, supplements and NSAIDS for one week prior to surgery per anesthesia guidelines  Tylenol is ok to take as needed  You will receive a call one business day prior to surgery with an arrival time and hospital directions  If your surgery is scheduled on a Monday, the hospital will be calling you on the Friday prior to your surgery  If you have not heard from anyone by 8pm, please call the hospital supervisor through the hospital  at [phone]  [place] [phone])  Do not eat or drink anything after midnight the night before your surgery, including candy, mints, lifesavers, or chewing gum  Do not drink alcohol 24hrs before your surgery  Try not to smoke at least 24hrs before your surgery  Follow the pre surgery showering instructions as listed in the [facility] Surgical Experience Booklet” or otherwise provided by your surgeon's office  Do not shave the surgical area 24 hours before surgery  Do not apply any lotions, creams, including makeup, cologne, deodorant, or perfumes after showering on the day of your surgery  No contact lenses, eye make-up, or artificial eyelashes  Remove nail polish, including gel polish, and any artificial, gel, or acrylic nails if possible  Remove all jewelry including rings and body piercing jewelry  Wear causal clothing that is easy to take on and off  Consider your type of surgery  Keep any valuables, jewelry, piercings at home   Please bring any specially ordered equipment (sling, braces) if indicated  Arrange for a responsible person to drive you to and from the hospital on the day of your surgery  Visitor Guidelines discussed  Call the surgeon's office with any new illnesses, exposures, or additional questions prior to surgery  Please reference your [facility] and the [facility] Surgical Experience Booklet” for additional information to prepare for your upcoming surgery

## 2023-05-11 ENCOUNTER — ANESTHESIA EVENT (OUTPATIENT)
Dept: PERIOP | Facility: HOSPITAL | Age: 48
End: 2023-05-11

## 2023-05-12 ENCOUNTER — HOSPITAL ENCOUNTER (OUTPATIENT)
Facility: HOSPITAL | Age: 48
Setting detail: OUTPATIENT SURGERY
Discharge: HOME | End: 2023-05-12
Attending: NEUROLOGICAL SURGERY | Admitting: NEUROLOGICAL SURGERY

## 2023-05-12 ENCOUNTER — TELEPHONE (OUTPATIENT)
Dept: NEUROSURGERY | Facility: CLINIC | Age: 48
End: 2023-05-12

## 2023-05-12 ENCOUNTER — APPOINTMENT (OUTPATIENT)
Dept: RADIOLOGY | Facility: HOSPITAL | Age: 48
End: 2023-05-12

## 2023-05-12 ENCOUNTER — ANESTHESIA (OUTPATIENT)
Dept: PERIOP | Facility: HOSPITAL | Age: 48
End: 2023-05-12

## 2023-05-12 VITALS
HEIGHT: 70 IN | SYSTOLIC BLOOD PRESSURE: 139 MMHG | DIASTOLIC BLOOD PRESSURE: 73 MMHG | WEIGHT: 210 LBS | RESPIRATION RATE: 17 BRPM | TEMPERATURE: 98 F | OXYGEN SATURATION: 96 % | BODY MASS INDEX: 30.06 KG/M2 | HEART RATE: 80 BPM

## 2023-05-12 DIAGNOSIS — Z98.890 POSTOPERATIVE STATE: ICD-10-CM

## 2023-05-12 DIAGNOSIS — M54.16 LUMBAR BACK PAIN WITH RADICULOPATHY AFFECTING LEFT LOWER EXTREMITY: Primary | ICD-10-CM

## 2023-05-12 LAB
GLUCOSE SERPL-MCNC: 102 MG/DL (ref 65–140)
INR PPP: 0.92 (ref 0.84–1.19)
PROTHROMBIN TIME: 12.5 SECONDS (ref 11.6–14.5)

## 2023-05-12 RX ORDER — ACETAMINOPHEN 325 MG/1
975 TABLET ORAL EVERY 8 HOURS PRN
Status: DISCONTINUED | OUTPATIENT
Start: 2023-05-12 | End: 2023-05-12 | Stop reason: HOSPADM

## 2023-05-12 RX ORDER — ONDANSETRON 2 MG/ML
INJECTION INTRAMUSCULAR; INTRAVENOUS AS NEEDED
Status: DISCONTINUED | OUTPATIENT
Start: 2023-05-12 | End: 2023-05-12

## 2023-05-12 RX ORDER — OXYCODONE HYDROCHLORIDE 5 MG/1
5 TABLET ORAL EVERY 4 HOURS PRN
Qty: 30 TABLET | Refills: 0 | Status: SHIPPED | OUTPATIENT
Start: 2023-05-12 | End: 2023-05-22

## 2023-05-12 RX ORDER — KETAMINE HCL IN NACL, ISO-OSM 100MG/10ML
SYRINGE (ML) INJECTION AS NEEDED
Status: DISCONTINUED | OUTPATIENT
Start: 2023-05-12 | End: 2023-05-12

## 2023-05-12 RX ORDER — MIDAZOLAM HYDROCHLORIDE 2 MG/2ML
INJECTION, SOLUTION INTRAMUSCULAR; INTRAVENOUS AS NEEDED
Status: DISCONTINUED | OUTPATIENT
Start: 2023-05-12 | End: 2023-05-12

## 2023-05-12 RX ORDER — METHOCARBAMOL 750 MG/1
750 TABLET, FILM COATED ORAL EVERY 6 HOURS PRN
Status: DISCONTINUED | OUTPATIENT
Start: 2023-05-12 | End: 2023-05-12 | Stop reason: HOSPADM

## 2023-05-12 RX ORDER — CHLORHEXIDINE GLUCONATE 0.12 MG/ML
15 RINSE ORAL ONCE
Status: COMPLETED | OUTPATIENT
Start: 2023-05-12 | End: 2023-05-12

## 2023-05-12 RX ORDER — ROCURONIUM BROMIDE 10 MG/ML
INJECTION, SOLUTION INTRAVENOUS AS NEEDED
Status: DISCONTINUED | OUTPATIENT
Start: 2023-05-12 | End: 2023-05-12

## 2023-05-12 RX ORDER — HYDROMORPHONE HCL IN WATER/PF 6 MG/30 ML
0.2 PATIENT CONTROLLED ANALGESIA SYRINGE INTRAVENOUS
Status: DISCONTINUED | OUTPATIENT
Start: 2023-05-12 | End: 2023-05-12 | Stop reason: HOSPADM

## 2023-05-12 RX ORDER — ONDANSETRON 2 MG/ML
4 INJECTION INTRAMUSCULAR; INTRAVENOUS ONCE AS NEEDED
Status: DISCONTINUED | OUTPATIENT
Start: 2023-05-12 | End: 2023-05-12 | Stop reason: HOSPADM

## 2023-05-12 RX ORDER — OXYCODONE HYDROCHLORIDE 5 MG/1
5 TABLET ORAL EVERY 4 HOURS PRN
Status: DISCONTINUED | OUTPATIENT
Start: 2023-05-12 | End: 2023-05-12 | Stop reason: HOSPADM

## 2023-05-12 RX ORDER — METHOCARBAMOL 750 MG/1
750 TABLET, FILM COATED ORAL EVERY 6 HOURS PRN
Qty: 30 TABLET | Refills: 0 | Status: SHIPPED | OUTPATIENT
Start: 2023-05-12 | End: 2023-05-25

## 2023-05-12 RX ORDER — PROPOFOL 10 MG/ML
INJECTION, EMULSION INTRAVENOUS AS NEEDED
Status: DISCONTINUED | OUTPATIENT
Start: 2023-05-12 | End: 2023-05-12

## 2023-05-12 RX ORDER — CEFAZOLIN SODIUM 1 G/3ML
INJECTION, POWDER, FOR SOLUTION INTRAMUSCULAR; INTRAVENOUS AS NEEDED
Status: DISCONTINUED | OUTPATIENT
Start: 2023-05-12 | End: 2023-05-12

## 2023-05-12 RX ORDER — FENTANYL CITRATE 50 UG/ML
INJECTION, SOLUTION INTRAMUSCULAR; INTRAVENOUS AS NEEDED
Status: DISCONTINUED | OUTPATIENT
Start: 2023-05-12 | End: 2023-05-12

## 2023-05-12 RX ORDER — LIDOCAINE HYDROCHLORIDE 10 MG/ML
INJECTION, SOLUTION EPIDURAL; INFILTRATION; INTRACAUDAL; PERINEURAL AS NEEDED
Status: DISCONTINUED | OUTPATIENT
Start: 2023-05-12 | End: 2023-05-12

## 2023-05-12 RX ORDER — FENTANYL CITRATE/PF 50 MCG/ML
25 SYRINGE (ML) INJECTION
Status: COMPLETED | OUTPATIENT
Start: 2023-05-12 | End: 2023-05-12

## 2023-05-12 RX ORDER — ACETAMINOPHEN 325 MG/1
975 TABLET ORAL EVERY 8 HOURS PRN
Refills: 0
Start: 2023-05-12

## 2023-05-12 RX ORDER — SODIUM CHLORIDE, SODIUM LACTATE, POTASSIUM CHLORIDE, CALCIUM CHLORIDE 600; 310; 30; 20 MG/100ML; MG/100ML; MG/100ML; MG/100ML
INJECTION, SOLUTION INTRAVENOUS CONTINUOUS PRN
Status: DISCONTINUED | OUTPATIENT
Start: 2023-05-12 | End: 2023-05-12

## 2023-05-12 RX ORDER — BUPIVACAINE HYDROCHLORIDE 2.5 MG/ML
INJECTION, SOLUTION EPIDURAL; INFILTRATION; INTRACAUDAL AS NEEDED
Status: DISCONTINUED | OUTPATIENT
Start: 2023-05-12 | End: 2023-05-12 | Stop reason: HOSPADM

## 2023-05-12 RX ORDER — DEXAMETHASONE SODIUM PHOSPHATE 10 MG/ML
INJECTION, SOLUTION INTRAMUSCULAR; INTRAVENOUS AS NEEDED
Status: DISCONTINUED | OUTPATIENT
Start: 2023-05-12 | End: 2023-05-12

## 2023-05-12 RX ADMIN — SODIUM CHLORIDE, SODIUM LACTATE, POTASSIUM CHLORIDE, AND CALCIUM CHLORIDE: .6; .31; .03; .02 INJECTION, SOLUTION INTRAVENOUS at 07:27

## 2023-05-12 RX ADMIN — CHLORHEXIDINE GLUCONATE 0.12% ORAL RINSE 15 ML: 1.2 LIQUID ORAL at 06:00

## 2023-05-12 RX ADMIN — Medication 20 MG: at 07:30

## 2023-05-12 RX ADMIN — OXYCODONE HYDROCHLORIDE 5 MG: 5 TABLET ORAL at 12:04

## 2023-05-12 RX ADMIN — ROCURONIUM BROMIDE 50 MG: 10 INJECTION, SOLUTION INTRAVENOUS at 07:31

## 2023-05-12 RX ADMIN — FENTANYL CITRATE 25 MCG: 50 INJECTION INTRAMUSCULAR; INTRAVENOUS at 10:26

## 2023-05-12 RX ADMIN — HYDROMORPHONE HYDROCHLORIDE 0.2 MG: 0.2 INJECTION, SOLUTION INTRAMUSCULAR; INTRAVENOUS; SUBCUTANEOUS at 10:28

## 2023-05-12 RX ADMIN — FENTANYL CITRATE 100 MCG: 50 INJECTION, SOLUTION INTRAMUSCULAR; INTRAVENOUS at 07:30

## 2023-05-12 RX ADMIN — ROCURONIUM BROMIDE 20 MG: 10 INJECTION, SOLUTION INTRAVENOUS at 08:01

## 2023-05-12 RX ADMIN — FENTANYL CITRATE 25 MCG: 50 INJECTION INTRAMUSCULAR; INTRAVENOUS at 10:06

## 2023-05-12 RX ADMIN — FENTANYL CITRATE 25 MCG: 50 INJECTION INTRAMUSCULAR; INTRAVENOUS at 10:11

## 2023-05-12 RX ADMIN — HYDROMORPHONE HYDROCHLORIDE 0.2 MG: 0.2 INJECTION, SOLUTION INTRAMUSCULAR; INTRAVENOUS; SUBCUTANEOUS at 10:50

## 2023-05-12 RX ADMIN — FENTANYL CITRATE 25 MCG: 50 INJECTION INTRAMUSCULAR; INTRAVENOUS at 10:20

## 2023-05-12 RX ADMIN — HYDROMORPHONE HYDROCHLORIDE 0.2 MG: 0.2 INJECTION, SOLUTION INTRAMUSCULAR; INTRAVENOUS; SUBCUTANEOUS at 11:22

## 2023-05-12 RX ADMIN — ONDANSETRON 4 MG: 2 INJECTION INTRAMUSCULAR; INTRAVENOUS at 09:23

## 2023-05-12 RX ADMIN — SUGAMMADEX 200 MG: 100 INJECTION, SOLUTION INTRAVENOUS at 09:51

## 2023-05-12 RX ADMIN — Medication 10 MG: at 09:14

## 2023-05-12 RX ADMIN — ROCURONIUM BROMIDE 20 MG: 10 INJECTION, SOLUTION INTRAVENOUS at 08:56

## 2023-05-12 RX ADMIN — MIDAZOLAM 2 MG: 1 INJECTION INTRAMUSCULAR; INTRAVENOUS at 07:24

## 2023-05-12 RX ADMIN — DEXAMETHASONE SODIUM PHOSPHATE 10 MG: 10 INJECTION, SOLUTION INTRAMUSCULAR; INTRAVENOUS at 07:56

## 2023-05-12 RX ADMIN — CEFAZOLIN 2000 MG: 1 INJECTION, POWDER, FOR SOLUTION INTRAMUSCULAR; INTRAVENOUS at 07:41

## 2023-05-12 RX ADMIN — PROPOFOL 200 MG: 10 INJECTION, EMULSION INTRAVENOUS at 07:30

## 2023-05-12 RX ADMIN — LIDOCAINE HYDROCHLORIDE 50 MG: 10 INJECTION, SOLUTION EPIDURAL; INFILTRATION; INTRACAUDAL; PERINEURAL at 07:30

## 2023-05-12 RX ADMIN — ROCURONIUM BROMIDE 10 MG: 10 INJECTION, SOLUTION INTRAVENOUS at 08:17

## 2023-05-12 NOTE — OP NOTE
OPERATIVE REPORT  PATIENT NAME: Ramandeep Romero    :  1975  MRN: [id number]  Pt Location: [facility] OR ROOM 09    SURGERY DATE: 2023    Surgeon(s) and Role:     * Bharathi Caldwell MD - Primary     * Neida Walters PA-C - Assisting    Preop Diagnosis:  Lumbar back pain with radiculopathy affecting left lower extremity [M54 16]    Post-Op Diagnosis Codes:     * Lumbar back pain with radiculopathy affecting left lower extremity [M54 16]    Procedure(s):  Left - Left L3/4 and Left L4/5 hemilaminotomy  foraminotomy  discectomy    Specimen(s):  * No specimens in log *    Estimated Blood Loss:   Minimal    Drains:  * No LDAs found *    Anesthesia Type:   General    Operative Indications:  Lumbar back pain with radiculopathy affecting left lower extremity [M54 16]      Operative Findings:  Left L3/4 and Left L4/5 laminotomies and foraminotomies performed  Left L4 and L5 nerve roots decompressed  Superiorly herniated HNP removed from behind the body of L4   L4/5 disk space entered and disk material removed    Complications:   None    Procedure and Technique:  Findings: General endotracheal anesthesia was induced  Appropriate intravenous antibiotics were given  Serial compression devices were applied to the legs and the patient was placed prone on the Pino table with all pressure points padded  The back was prepped and draped in the usual sterile fashion  Timeout was performed per protocol  A linear incision in the midline over the spinous processes of L3 and L4 and L5  was opened up using a #10 blade  The incision was carried down to the dorsal lumbodorsal fascia  Hemostasis was obtained  Fascia was incised using Bovie cautery and the subperiosteal plane was dissected over the left spinous processes  Self-retaining retractors were inserted  X-ray confirmation of the appropriate level was obtained  A radiographic timeout was performed per protocol    Direct discussion with radiology in real time in the OR occurred  Laminotomy was performed using Midas Owen drill and Kerrison rongeurs at L3/4 on the left  Bone edges were waxed  Origin of the ligamentum flavum was visualized and elevated using a dental tool and removed using Kerrison  Thecal sac and descending nerve root were well visualized  The nerve root was dissected free in the foramen and a foraminotomy was performed using Kerrison  Laminotomy was performed using Midas Owen drill and Kerrison rongeurs at L4/5 on the left  Bone edges were waxed  Origin of the ligamentum flavum was visualized and elevated using a dental tool and removed using Kerrison  Thecal sac and descending nerve root were well visualized  The nerve root was dissected free in the foramen and a foraminotomy was performed using Kerrison  The lateral aspect of the nerve root was then gently dissected and retracted medially using a nerve root retractor  With the nerve root protected, the disc herniation was incised using a #11 blade  Downward medial pressure was used to express disc material   Further disc material was removed using down-biting curettes and pituitary rongeurs  The superior herniated portion of the disk was removed using pituitary  There was no residual disk herniation visible or palpable from above, heading down from the L3/4 level  The disc space at L4/5, but not L3/4 was entered and some additional disc material was removed in the hopes of preventing recurrence  No evidence of CSF leak was noted  Hemostasis was obtained  The wound was closed in layers of 0 Vicryl, 2-0 Vicryl, 3-0 Monocryl and glue at the skin  Prior to complete closure approximately 10cc of 0 25% Marcaine was instilled in the musculature  Disposition: The patient tolerated the procedure well  The patient was extubated in the OR  The patient went to PACU  Condition: hemodynamically stable  Counts were correct for needles, sponges, and cottonoids       I was present for the entire procedure , A qualified resident physician was not available  and A physician assistant was required during the procedure for retraction, tissue handling, dissection and suturing      Patient Disposition:  PACU  and extubated and stable        SIGNATURE: eJssica Pearson MD  DATE: May 12, 2023  TIME: 9:46 AM

## 2023-05-12 NOTE — PROGRESS NOTES
Post op check  Emerging from anesthesia  Moving all extremities at baseline  Wound: CDI  A/P Doing well         Mobilize    I spoke with the patient's wife

## 2023-05-12 NOTE — ANESTHESIA POSTPROCEDURE EVALUATION
Post-Op Assessment Note    CV Status:  Stable  Pain Score: 0    Pain management: adequate     Mental Status:  Alert and awake   Hydration Status:  Euvolemic   PONV Controlled:  Controlled   Airway Patency:  Patent      Post Op Vitals Reviewed: Yes      Staff: CRNA         There were no known notable events for this encounter      /74 (05/12/23 0957)    Temp (!) 97 °F (36 1 °C) (05/12/23 0957)    Pulse 79 (05/12/23 0957)   Resp   12   SpO2   100%  6L fM

## 2023-05-12 NOTE — INTERVAL H&P NOTE
H&P reviewed  After examining the patient I find no changes in the patients condition since the H&P had been written      Vitals:    05/12/23 0537   BP: 98/86   Pulse: 77   Resp: 18   Temp: (!) 97 4 °F (36 3 °C)   SpO2: 97%

## 2023-05-12 NOTE — ANESTHESIA PREPROCEDURE EVALUATION
Procedure:  Left L3/4 and Left L4/5 hemilaminotomy, foraminotomy, discectomy (Left: Spine Lumbar)    Relevant Problems   CARDIO   (+) Hypercholesteremia      ENDO   (+) Primary hypothyroidism      /RENAL   (+) Renal calculus, right      PULMONARY   (+) Smoking        Physical Exam    Airway    Mallampati score: II  TM Distance: >3 FB  Neck ROM: full     Dental   No notable dental hx     Cardiovascular  Cardiovascular exam normal    Pulmonary  Pulmonary exam normal     Other Findings        Anesthesia Plan  ASA Score- 2     Anesthesia Type- general with ASA Monitors  Additional Monitors:   Airway Plan: ETT  Plan Factors-Exercise tolerance (METS): >4 METS  Chart reviewed  EKG reviewed  Existing labs reviewed  Patient summary reviewed  Patient is a current smoker  Patient smoked on day of surgery  Induction- intravenous  Postoperative Plan- Plan for postoperative opioid use  Informed Consent- Anesthetic plan and risks discussed with patient  I personally reviewed this patient with the CRNA  Discussed and agreed on the Anesthesia Plan with the CRNA  Mary Russell

## 2023-05-24 DIAGNOSIS — E03.9 PRIMARY HYPOTHYROIDISM: ICD-10-CM

## 2023-05-24 RX ORDER — LEVOTHYROXINE SODIUM 137 UG/1
137 TABLET ORAL DAILY
Qty: 90 TABLET | Refills: 1 | Status: SHIPPED | OUTPATIENT
Start: 2023-05-24

## 2023-05-25 ENCOUNTER — CLINICAL SUPPORT (OUTPATIENT)
Dept: NEUROSURGERY | Facility: CLINIC | Age: 48
End: 2023-05-25

## 2023-05-25 VITALS
SYSTOLIC BLOOD PRESSURE: 136 MMHG | OXYGEN SATURATION: 98 % | RESPIRATION RATE: 18 BRPM | WEIGHT: 216 LBS | DIASTOLIC BLOOD PRESSURE: 82 MMHG | BODY MASS INDEX: 30.24 KG/M2 | HEIGHT: 71 IN | TEMPERATURE: 97.6 F | HEART RATE: 83 BPM

## 2023-05-25 DIAGNOSIS — Z98.890 STATUS POST SURGERY: Primary | ICD-10-CM

## 2023-05-25 DIAGNOSIS — M54.16 LUMBAR BACK PAIN WITH RADICULOPATHY AFFECTING LEFT LOWER EXTREMITY: ICD-10-CM

## 2023-05-25 DIAGNOSIS — Z48.89 ENCOUNTER FOR POST SURGICAL WOUND CHECK: ICD-10-CM

## 2023-05-25 NOTE — PROGRESS NOTES
"                                                                             Post-Op Visit- Neurosurgery    Sarabjit Shin 49 y o  male MRN: [id number]    Chief Complaint:   Patient presents post: Left L3/4 and Left L4/5 hemilaminotomy, foraminotomy, discectomy - Left    History of Present Illness:  Patient presents for 2 week POV for incision check accompanied by spouse and ambulating without an assistive device  Patient reports he is doing well overall and denies any incisional issues or fevers  He denies any new weakness, numbness or tingling since the surgery and denies any new issues with his bowel or bladder  Patient admits to slowly improving surgical pain at this time but has occasional low back pain and left-sided numbness like he had prior to surgery  He stopped taking his gabapentin prior to surgery and is no longer taking any muscle relaxer or narcotic pain medication  Assessment:  Vitals:    05/25/23 1327   BP: 136/82   Pulse: 83   Resp: 18   Temp: 97 6 °F (36 4 °C)   SpO2: 98%   Weight: 98 kg (216 lb)   Height: 5' 11\" (1 803 m)        Wound Exam: Incision well approximated  No erythema, edema or drainage present  Location: low back  Complications: None  Incision ADDIE  Discussion/Summary:  Reviewed incision care with patient including daily observation for s/s infection including: increased erythema, edema, drainage, dehiscence of incision or fever >101  Should these be observed, he understands that he is to call and/or return immediately for reassessment  Advised patient to continue cleansing area with mild soap and water and pat dry  Not to apply any lotions, creams, or ointments, & not to submerge in any water for two more weeks  He is to maintain activity restrictions until cleared by the surgeon  Activity levels were also reviewed with the patient in detail, he is to slowly increase his level of activity and ambulation is encouraged as tolerated   Verified date/time/location of " upcoming POV and reminded him to call the office with any further questions or concerns, or if any incisional issues or fevers would arise  We did discuss restarting gabapentin if his nerve symptoms become troublesome  Recommended slowly increasing his dosage and not abruptly stopping this medication due to seizure risk

## 2023-05-25 NOTE — PATIENT INSTRUCTIONS
Continue incision care as instructed  Cleans incision area(s) with soap and water and pat dry  Avoid lotions, creams or ointments for two more weeks  Avoid soaking in water (bath tubs, hot tubs) for two more weeks  Maintain activity restrictions until cleared by the surgeon  Avoid heavy lifting and frequent bending/twisting  Call the office immediately with any signs or symptoms of infection including: increasing redness, swelling, drainage or fevers (101 or greater)

## 2023-05-31 ENCOUNTER — EVALUATION (OUTPATIENT)
Dept: PHYSICAL THERAPY | Facility: CLINIC | Age: 48
End: 2023-05-31

## 2023-05-31 DIAGNOSIS — M54.16 LUMBAR BACK PAIN WITH RADICULOPATHY AFFECTING LEFT LOWER EXTREMITY: ICD-10-CM

## 2023-05-31 DIAGNOSIS — Z98.890 STATUS POST SURGERY: ICD-10-CM

## 2023-06-05 ENCOUNTER — OFFICE VISIT (OUTPATIENT)
Dept: PHYSICAL THERAPY | Facility: CLINIC | Age: 48
End: 2023-06-05
Payer: COMMERCIAL

## 2023-06-05 DIAGNOSIS — M54.16 LUMBAR BACK PAIN WITH RADICULOPATHY AFFECTING LEFT LOWER EXTREMITY: Primary | ICD-10-CM

## 2023-06-05 DIAGNOSIS — Z98.890 STATUS POST SURGERY: ICD-10-CM

## 2023-06-05 PROCEDURE — 97112 NEUROMUSCULAR REEDUCATION: CPT | Performed by: PHYSICAL THERAPIST

## 2023-06-05 NOTE — PROGRESS NOTES
"Daily Note     Today's date: 2023  Patient name: Ale Mcekon  : 1975  MRN: [id number]  Referring provider: Ryan Joaquin,*  Dx:   Encounter Diagnosis     ICD-10-CM    1  Lumbar back pain with radiculopathy affecting left lower extremity  M54 16       2  Status post surgery  Z98 890                      Subjective: Pt reports good compliance with HEP 3xD, notes mild anterior L thigh soreness  Objective: See treatment diary below  Repeated motions: EIL: abolished after 4x10  Instructed pt to perform core stab 2xD, EIL 3x10 5xD    Assessment: Pt demonstrated an extension DP, is now able to abolish pain with EIL  Plan: Assess response nv  Precautions:  PMHx: GERD  No lifting > 15 lbs  Dx: s/p L L3/4, 4/5 hemilaminectomy, foraminotomy, discectomy 23    Manuals  6/           L hip IR, FF MWM  1x20 ea                                                  Neuro Re-Ed             Abdominal bracing 5\"x15 5\"x10           Supine DLS marches 5N25 1L83           bridges 3x10 3x10           birddog             Prone press-ups 3x10 5x10           L seated sciatic nerve flossing  nv                        Ther Ex             SKC             B supine glute stretch  nv                                                                                         Ther Activity                                       Gait Training                                       Modalities                                            "

## 2023-06-08 ENCOUNTER — OFFICE VISIT (OUTPATIENT)
Dept: PHYSICAL THERAPY | Facility: CLINIC | Age: 48
End: 2023-06-08
Payer: COMMERCIAL

## 2023-06-08 DIAGNOSIS — M54.16 LUMBAR BACK PAIN WITH RADICULOPATHY AFFECTING LEFT LOWER EXTREMITY: Primary | ICD-10-CM

## 2023-06-08 DIAGNOSIS — Z98.890 STATUS POST SURGERY: ICD-10-CM

## 2023-06-08 PROCEDURE — 97112 NEUROMUSCULAR REEDUCATION: CPT

## 2023-06-08 NOTE — PROGRESS NOTES
"Daily Note     Today's date: 2023  Patient name: Gomez Morin  : 1975  MRN: [id number]  Referring provider: Leslee Kent,*  Dx:   Encounter Diagnosis     ICD-10-CM    1  Lumbar back pain with radiculopathy affecting left lower extremity  M54 16       2  Status post surgery  Z98 890           Start Time: 1200  Stop Time: 1240  Total time in clinic (min): 40 minutes    Subjective: Patient reports L quad cramping prior to therapy  He notes completing the EIL at home with no response  Objective: See treatment diary below    Assessment: Minor corrections made to EIL form with good response  Able to abolish L quad cramping following therapy  Good form t/o other exercises  Assess response NV  Plan: Assess response nv  Precautions:  PMHx: GERD  No lifting > 15 lbs  Dx: s/p L L3/4, 4/5 hemilaminectomy, foraminotomy, discectomy 23    Manuals  6          L hip IR, FF MWM  1x20 ea           L hip PROM   5 min                                    Neuro Re-Ed             Abdominal bracing 5\"x15 5\"x10 5\"x10          Supine DLS marches 6I51 1X47 3x10          bridges 3x10 3x10 3x10          birddog             Prone press-ups 3x10 5x10 3x10          L seated sciatic nerve flossing  nv                        Ther Ex             SKC             B supine glute stretch  nv 15\"x3                                                                                        Ther Activity                                       Gait Training                                       Modalities                                          "

## 2023-06-12 ENCOUNTER — OFFICE VISIT (OUTPATIENT)
Dept: PHYSICAL THERAPY | Facility: CLINIC | Age: 48
End: 2023-06-12
Payer: COMMERCIAL

## 2023-06-12 DIAGNOSIS — Z98.890 STATUS POST SURGERY: ICD-10-CM

## 2023-06-12 DIAGNOSIS — M54.16 LUMBAR BACK PAIN WITH RADICULOPATHY AFFECTING LEFT LOWER EXTREMITY: Primary | ICD-10-CM

## 2023-06-12 PROCEDURE — 97140 MANUAL THERAPY 1/> REGIONS: CPT | Performed by: PHYSICAL THERAPIST

## 2023-06-12 PROCEDURE — 97112 NEUROMUSCULAR REEDUCATION: CPT | Performed by: PHYSICAL THERAPIST

## 2023-06-12 NOTE — PROGRESS NOTES
"Daily Note     Today's date: 2023  Patient name: Flora Gold  : 1975  MRN: [id number]  Referring provider: Jessie Henderson,*  Dx:   Encounter Diagnosis     ICD-10-CM    1  Lumbar back pain with radiculopathy affecting left lower extremity  M54 16       2  Status post surgery  Z98 890                      Subjective: Pt reports mod L/S stiffness without pain, very mild anterior L thigh pain intermittently  Objective: See treatment diary below  PA assessment:  S1, 3/6 L3-5    Assessment: Pt demonstrated mild PPT with end ROM squat during functional lifting, able to correct with cueing x1  Plan: Progress lifting tolerance nv  Precautions:  PMHx: GERD  No lifting > 15 lbs  Dx: s/p L L3/4, 4/5 hemilaminectomy, foraminotomy, discectomy 23    Manuals          L hip IR, FF MWM  1x20 ea  1x20 ea         L hip PROM   5 min          Gr IV PA mobs    L5  1x20  S1  2x30                      Neuro Re-Ed             Abdominal bracing 5\"x15 5\"x10 5\"x10          Supine DLS marches 3A03 9X81 3x10          bridges 3x10 3x10 3x10 3x20         birddog    15\"x3         Prone press-ups 3x10 5x10 3x10 3x10         L seated sciatic nerve flossing  nv                        Ther Ex             SKC             B supine glute stretch  nv 15\"x3 20\"x3                                                                                       Ther Activity             Lifting mechanics    5#  1x10                      Gait Training                                       Modalities                                          "

## 2023-06-13 ENCOUNTER — TELEPHONE (OUTPATIENT)
Dept: NEUROSURGERY | Facility: CLINIC | Age: 48
End: 2023-06-13

## 2023-06-13 NOTE — TELEPHONE ENCOUNTER
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

## 2023-06-13 NOTE — TELEPHONE ENCOUNTER
Jose Antonio Delgado MD  to Me  6/13/23  1:55 PM  He's a month out from surgery  They should be gradually increasing his weight restrictions, so it's OK  Thanks         Reached out to patient and informed him of the above  Advised PT will be able to see my note in the system however if they are in need of paperwork to have them call the office  He was appreciative of the call back

## 2023-06-15 ENCOUNTER — OFFICE VISIT (OUTPATIENT)
Dept: PHYSICAL THERAPY | Facility: CLINIC | Age: 48
End: 2023-06-15
Payer: COMMERCIAL

## 2023-06-15 DIAGNOSIS — M54.16 LUMBAR BACK PAIN WITH RADICULOPATHY AFFECTING LEFT LOWER EXTREMITY: Primary | ICD-10-CM

## 2023-06-15 DIAGNOSIS — Z98.890 STATUS POST SURGERY: ICD-10-CM

## 2023-06-15 PROCEDURE — 97140 MANUAL THERAPY 1/> REGIONS: CPT | Performed by: PHYSICAL THERAPIST

## 2023-06-15 PROCEDURE — 97530 THERAPEUTIC ACTIVITIES: CPT | Performed by: PHYSICAL THERAPIST

## 2023-06-15 PROCEDURE — 97112 NEUROMUSCULAR REEDUCATION: CPT | Performed by: PHYSICAL THERAPIST

## 2023-06-15 NOTE — PROGRESS NOTES
"Daily Note     Today's date: 6/15/2023  Patient name: Gabriel Porter  : 1975  MRN: [id number]  Referring provider: Roger Saavedra,*  Dx:   Encounter Diagnosis     ICD-10-CM    1  Lumbar back pain with radiculopathy affecting left lower extremity  M54 16       2  Status post surgery  Z98 890                      Subjective: Pt reports no LBP, < 1/10 anterior L thigh pain briefly and intermittently that resolves with standing  He reports being cleared to lift as tolerated per PT discretion by his surgeon  Objective: See treatment diary below    Assessment: Pt demonstrated mild PPT once sufficiently fatigued with box lifts  Pt demonstrated good understanding of when to perform EIL or EIS when at work based on recent lifting or feelings of pain or increased stiffness  Plan: Progress lifting tolerance nv  Precautions:  PMHx: GERD  No lifting > 15 lbs  Dx: s/p L L3/4, 4/5 hemilaminectomy, foraminotomy, discectomy 23    Manuals 5/31 6/5 6/8 6/12 6/15        L hip IR, FF MWM  1x20 ea  1x20 ea FF  B/  1x20        L hip PROM   5 min          Gr IV PA mobs    L5  1x20  S1  2x30 L5  1x20  S1  2x30                     Neuro Re-Ed             Abdominal bracing 5\"x15 5\"x10 5\"x10          Supine DLS marches 6Z82 [id number] 3x10          bridges 3x10 3x10 3x10 3x20 3x30        birddog    15\"x3 20\"x3        Prone thoracic extension     3x15        Prone press-ups 3x10 5x10 3x10 3x10 5x10        Standing lumbar extension     1x10        Standing lumbar extension hips against table     1x10                                  Ther Ex             SKC             B supine glute stretch  nv 15\"x3 20\"x3 HEP                                                                                      Ther Activity             Lifting mechanics    5#  1x10         Box lifts from floor     15#  1x10  20#  1x10  25#  1x5 20#  1x10  25#  1x10  30#  1x10       Box lifts and carry      25#  3x70 ft       Tool bag carry      15#  1x300 " "ft  8\"/  1x10 ea                                 Gait Training                                       Modalities                                                                                 "

## 2023-06-21 ENCOUNTER — OFFICE VISIT (OUTPATIENT)
Dept: PHYSICAL THERAPY | Facility: CLINIC | Age: 48
End: 2023-06-21
Payer: COMMERCIAL

## 2023-06-21 DIAGNOSIS — Z98.890 STATUS POST SURGERY: ICD-10-CM

## 2023-06-21 DIAGNOSIS — M54.16 LUMBAR BACK PAIN WITH RADICULOPATHY AFFECTING LEFT LOWER EXTREMITY: Primary | ICD-10-CM

## 2023-06-21 PROCEDURE — 97530 THERAPEUTIC ACTIVITIES: CPT | Performed by: PHYSICAL THERAPIST

## 2023-06-21 PROCEDURE — 97112 NEUROMUSCULAR REEDUCATION: CPT | Performed by: PHYSICAL THERAPIST

## 2023-06-21 NOTE — PROGRESS NOTES
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 [facility]  that he could relieve with EIS  Objective: See treatment diary below    Assessment: Pt demonstrated improved ability to limit PPT with heavy lifting, no loss of extension ROM following extensive functional lifting training  Plan: Prepare for possible d/c nv with final lifting assessment  Precautions:  PMHx: GERD  No lifting > 15 lbs  Dx: s/p L L3/4, 4/5 hemilaminectomy, foraminotomy, discectomy 23    Manuals  6/5 6/8 6/12 6/15 6/21       L hip IR, FF MWM  1x20 ea  1x20 ea FF  B/  1x20 FF/  B/  1x20       L hip PROM   5 min          Gr IV PA mobs    L5  1x20  S1  2x30 L5  1x20  S1  2x30 L4-S1  1x30 ea                    Neuro Re-Ed             Abdominal bracing 5\"x15 5\"x10 5\"x10          Supine DLS marches 0Z91 0B25 3x10          bridges 3x10 3x10 3x10 3x20 3x30 3x35       birddog    15\"x3 20\"x3 nv       Prone thoracic extension     3x15 3x15       Prone press-ups 3x10 5x10 3x10 3x10 5x10 5x10       Standing lumbar extension     1x10 1x10       Standing lumbar extension hips against table     1x10 3x10                                 Ther Ex             SKC      B/  10\"x3       B supine glute stretch  nv 15\"x3 20\"x3 HEP        Standing HA stretch      B/  10\"x3                                                                        Ther Activity             Lifting mechanics    5#  1x10         Box lifts from floor     15#  1x10  20#  1x10  25#  1x5 High, low handles  25#  1x3 ea  35#  1x3 ea  45#  1x3 ea  55#  1x3 ea       Box lifts and " "carry      25#, 35#, 45#, 55#  1x75 ft ea  30#  8\"/  1x6 ea       Tool bag carry                                       Gait Training                                       Modalities                                                                                 "

## 2023-06-23 ENCOUNTER — OFFICE VISIT (OUTPATIENT)
Dept: PHYSICAL THERAPY | Facility: CLINIC | Age: 48
End: 2023-06-23
Payer: COMMERCIAL

## 2023-06-23 DIAGNOSIS — Z98.890 STATUS POST SURGERY: ICD-10-CM

## 2023-06-23 DIAGNOSIS — M54.16 LUMBAR BACK PAIN WITH RADICULOPATHY AFFECTING LEFT LOWER EXTREMITY: Primary | ICD-10-CM

## 2023-06-23 PROCEDURE — 97530 THERAPEUTIC ACTIVITIES: CPT | Performed by: PHYSICAL THERAPIST

## 2023-06-23 NOTE — PROGRESS NOTES
"Daily Note     Today's date: 2023  Patient name: Francheska Mercado  : 1975  MRN: [id number]  Referring provider: Angeles Spring,*  Dx:   Encounter Diagnosis     ICD-10-CM    1  Lumbar back pain with radiculopathy affecting left lower extremity  M54 16       2  Status post surgery  Z98 890                      Subjective: Pt reports LB soreness, no pain or LE symptoms after functional lifting last visit  Objective: See treatment diary below    Assessment: Pt demonstrated no pain or loss of extension ROM following extensive functional lifting training  Pt is appropriate for RTW pending MD assessment  Plan: Prepare for d/c NV to assess response to probable RTW  Precautions:  PMHx: GERD  Dx: s/p L L3/4, 4/5 hemilaminectomy, foraminotomy, discectomy 23    Manuals 5/31 6/5 6/8 6/12 6/15 6/21 6/23      L hip IR, FF MWM  1x20 ea  1x20 ea FF  B/  1x20 FF/  B/  1x20 FF/  B/  1x20      L hip PROM   5 min          Gr IV PA mobs    L5  1x20  S1  2x30 L5  1x20  S1  2x30 L4-S1  1x30 ea L4-S1  1x30 ea                   Neuro Re-Ed             Abdominal bracing 5\"x15 5\"x10 5\"x10          Supine DLS marches 5Q48 [id number] 3x10          bridges 3x10 3x10 3x10 3x20 3x30 3x35 3x35      birddog    15\"x3 20\"x3 nv       Prone thoracic extension     3x15 3x15 3x20      Prone press-ups 3x10 5x10 3x10 3x10 5x10 5x10 5x10      Standing lumbar extension     1x10 1x10 1x10      Standing lumbar extension hips against table     1x10 3x10 3x10                                Ther Ex             SKC      B/  10\"x3 B/  10\"x3      B supine glute stretch  nv 15\"x3 20\"x3 HEP        Standing HA stretch      B/  10\"x3 B/  10\"x3                                                                       Ther Activity             Lifting mechanics    5#  1x10         Box lifts from floor     15#  1x10  20#  1x10  25#  1x5 High, low handles  25#  1x3 ea  35#  1x3 ea  45#  1x3 ea  55#  1x3 ea 35#  1x2 ea  45#  1x2 ea  55#  1x1 ea  65#  1x1 " "ea  75#  1x1 ea      Box lifts and carry      25#, 35#, 45#, 55#  1x75 ft ea  30#  8\"/  1x6 ea 35#  45#  55#  65#  75#  1x75 ft ea      Tool bag carry       15#  25#  150 ft  + 8\" stairs x12 ea                                Gait Training                                       Modalities                                                                                 "

## 2023-06-26 ENCOUNTER — APPOINTMENT (OUTPATIENT)
Dept: PHYSICAL THERAPY | Facility: CLINIC | Age: 48
End: 2023-06-26
Payer: COMMERCIAL

## 2023-06-26 ENCOUNTER — OFFICE VISIT (OUTPATIENT)
Dept: NEUROSURGERY | Facility: CLINIC | Age: 48
End: 2023-06-26

## 2023-06-26 VITALS
HEART RATE: 93 BPM | RESPIRATION RATE: 18 BRPM | HEIGHT: 71 IN | DIASTOLIC BLOOD PRESSURE: 72 MMHG | WEIGHT: 222.2 LBS | BODY MASS INDEX: 31.11 KG/M2 | SYSTOLIC BLOOD PRESSURE: 124 MMHG | TEMPERATURE: 98.3 F

## 2023-06-26 DIAGNOSIS — Z98.890 STATUS POST SURGERY: Primary | ICD-10-CM

## 2023-06-26 PROCEDURE — 99024 POSTOP FOLLOW-UP VISIT: CPT | Performed by: NEUROLOGICAL SURGERY

## 2023-06-26 NOTE — PROGRESS NOTES
45 status post Left L3/4 and Left L4/5 hemilaminotomy, foraminotomy, discectomy - Left on 5/12/2023     Wound: healed, clean, dry and intact    Medications: none    occassional stiffness in back    A/P Doing well  He is quite pleased as am I  I explained to the patient that nerves heal at approximately 1 mm/day or 1 inch per month  This means that it can take a year or more to reach maximum healing  I reassured him that it is normal to have occassional back stiffness and leg twinges  This should continue to improve until at least 1 year after surgery  Graduating from PT    He was instructed to continue excercises to help prevent recurrence  OK RTW full time, full duty  Follow up as needed  All questions answered

## 2023-06-29 ENCOUNTER — APPOINTMENT (OUTPATIENT)
Dept: PHYSICAL THERAPY | Facility: CLINIC | Age: 48
End: 2023-06-29
Payer: COMMERCIAL

## 2023-08-02 ENCOUNTER — RA CDI HCC (OUTPATIENT)
Dept: OTHER | Facility: HOSPITAL | Age: 48
End: 2023-08-02

## 2023-08-02 NOTE — PROGRESS NOTES
[facility] [address] [facility] coding opportunities       Chart reviewed, no opportunity found: CHART REVIEWED, NO OPPORTUNITY FOUND        Patients Insurance        Commercial Insurance: [employer]

## 2023-08-15 ENCOUNTER — OFFICE VISIT (OUTPATIENT)
Dept: INTERNAL MEDICINE CLINIC | Age: 48
End: 2023-08-15
Payer: COMMERCIAL

## 2023-08-15 VITALS
HEIGHT: 71 IN | OXYGEN SATURATION: 98 % | SYSTOLIC BLOOD PRESSURE: 116 MMHG | WEIGHT: 218.5 LBS | TEMPERATURE: 97 F | HEART RATE: 83 BPM | BODY MASS INDEX: 30.59 KG/M2 | DIASTOLIC BLOOD PRESSURE: 74 MMHG

## 2023-08-15 DIAGNOSIS — E78.00 HYPERCHOLESTEREMIA: Primary | ICD-10-CM

## 2023-08-15 DIAGNOSIS — K64.2 GRADE III HEMORRHOIDS: ICD-10-CM

## 2023-08-15 DIAGNOSIS — E03.9 PRIMARY HYPOTHYROIDISM: ICD-10-CM

## 2023-08-15 PROBLEM — Z01.818 PRE-OP EXAMINATION: Status: RESOLVED | Noted: 2023-05-02 | Resolved: 2023-08-15

## 2023-08-15 PROCEDURE — 99213 OFFICE O/P EST LOW 20 MIN: CPT | Performed by: FAMILY MEDICINE

## 2023-08-15 RX ORDER — NAPROXEN SODIUM 220 MG
220 TABLET ORAL DAILY PRN
COMMUNITY

## 2023-08-15 RX ORDER — LEVOTHYROXINE SODIUM 137 UG/1
137 TABLET ORAL DAILY
Qty: 90 TABLET | Refills: 3 | Status: SHIPPED | OUTPATIENT
Start: 2023-08-15

## 2023-08-15 NOTE — PROGRESS NOTES
Assessment/Plan:    1. Hypercholesteremia    2. Primary hypothyroidism  -     levothyroxine 137 mcg tablet; Take 1 tablet (137 mcg total) by mouth daily    3. Grade III hemorrhoids            There are no Patient Instructions on file for this visit. Return for dec 2023. Subjective:      Patient ID: Thea Rivera is a 49 y.o. male. Chief Complaint   Patient presents with   • Follow-up     4 m f/u visit for hypothyroidism, no recent labs. He c/o weakness in his left leg since back surgery in May. HPI      Primary hypothyroidism, on Synthroid 137 mcg.  Recently had lab work which shows slightly hyper TSH but normal T4.  Not having any symptoms of hypo or hyperthyroidism and compliant with medication.  Does not need refills. December 2021, needs refills.  Compliant with medications.  No breakthrough symptoms except for some issues with sleeping due to his work schedule change. November 2022, compliant medications. No breakthrough symptoms. TSH slightly in the hypo range but T4 appropriate  August 2023, lab work due this coming November, patient states he sweats a lot and feels hot in the summertime. He works in [employer] and he works a lot in [employer]     Hypercholesterolemia, not on medications. [id number] lab work done in December which showed elevated cholesterol levels however currently they are 20 points better with dietary modifications. [facility] is very active and does martial arts 3 times per week.  Benson Gardner 2021, not on medications.  Triglycerides slightly increased.  Was taking over-the-counter Krill oil but now stopped.  Does not do martial arts at the moment due to his change in work schedule.  Martha Montiel 2022, last lipid levels in range. Will add to the blood work this time since it was fasting.   Will repeat in 1 year if normal  August 2023, compliant with weight management not due for repeat labs till this fall     Smoking, slightly less than 1ppd, not interested in quitting at this time     Back pain:  Started 1 month ago lower lumbar midline. Has difficulty standing straight from a seated position or when getting out of his truck. Cannot lay flat at night and has to lay on this side. Tried Aleve for 3 days with no relief. He does do heavy lifting and yd work and has been cleaning up leaves around his yd but cannot think of anything he did for this. Of note he usually has this issue in the winter during shoveling snow all where his back “goes out”. He used to see a chiropractor in the past but then got tired of going on a regular basis. He denies any radiating pain down any leg and he denies any trauma. August 2023, intermittent but stable           The following portions of the patient's history were reviewed and updated as appropriate: allergies, current medications, past family history, past medical history, past social history, past surgical history and problem list.    Review of Systems      Current Outpatient Medications   Medication Sig Dispense Refill   • KRILL OIL PO Take by mouth Daily     • levothyroxine 137 mcg tablet Take 1 tablet (137 mcg total) by mouth daily 90 tablet 3   • Magnesium 100 MG CAPS Take by mouth Patient unsure of dosage     • naproxen sodium (Aleve) 220 MG tablet Take 220 mg by mouth daily as needed for mild pain     • VITAMIN D PO Take by mouth Daily     • acetaminophen (TYLENOL) 325 mg tablet Take 3 tablets (975 mg total) by mouth every 8 (eight) hours as needed for mild pain  0     No current facility-administered medications for this visit.        Objective:    /74 (BP Location: Left arm, Patient Position: Sitting, Cuff Size: Large)   Pulse 83   Temp (!) 97 °F (36.1 °C) (Tympanic)   Ht 5' 11" (1.803 m)   Wt 99.1 kg (218 lb 8 oz)   SpO2 98%   BMI 30.47 kg/m²        Physical Exam           Bran Das DO

## 2023-11-11 NOTE — DISCHARGE INSTR - AVS FIRST PAGE
Discharge Instructions  Lumbar decompression  (laminectomy/laminotomy/foraminotomy/discectomy)      Surgical incisional care:  Maintain dressing in place for 3 days  On postoperative day 3, dressing may be removed and incision may be left open to air  Keep incision clean and dry  Avoid applying creams, lotion or antiseptic to incision area  Check the wound daily  If the incision becomes red, swollen, tender, warm, or has increased drainage please notify physician immediately  If steri-strips are in place, allow them to fall off  If still in place at two week postoperative visit, we will remove them  May shower 3 days after surgery, but do not soak in a tub and no swimming  Incision may be cleaned with water and a mild antimicrobial soap using a clean washcloth  Incision is to be gently patted dry with a clean towel  Continue to change bed linens and pajamas more frequently  Wear clean clothes daily  Activity Restrictions:  No heavy lifting greater than 10lbs and no strenuous activities until cleared  No bending or twisting  No BLTs (bending, lifting, twisting)  Avoid pushing/pulling movements  May walk as tolerated  Encourage at least 4 short walks per day  No driving for at least 2 weeks or until cleared by physician  Postoperative medication:  Take pain medications to relieve incision pain, and muscle relaxants to prevent spasms as directed  Please see after visit summary (AVS) for details  Do not operate heavy machinery or vehicles while taking sedating medications  Use a bowel regimen while on opioids as they induce constipation  Ie  Senokot-S, Miralax, Colace, etc  Increase fiber and water intake  Do not take ibuprofen, Naproxen/Aleve or any NSAID until cleared by surgeon  May take Tylenol instead  If taking Coumadin, Aspirin, or Plavix, you may resume these medications when cleared by Neurosurgery  Follow-up as scheduled for a 2 week incision check   Follow-up as scheduled for 6 week postoperative visit       **Please notify MD if you experience a fever 101F, chills or have increased pain, numbness, tingling, or weakness in your legs and/or bowel/bladder dysfunction/incontinence** English

## 2023-12-26 ENCOUNTER — OFFICE VISIT (OUTPATIENT)
Dept: INTERNAL MEDICINE CLINIC | Facility: OTHER | Age: 48
End: 2023-12-26
Payer: COMMERCIAL

## 2023-12-26 VITALS
HEIGHT: 70 IN | SYSTOLIC BLOOD PRESSURE: 126 MMHG | OXYGEN SATURATION: 99 % | WEIGHT: 215 LBS | HEART RATE: 99 BPM | TEMPERATURE: 98.7 F | DIASTOLIC BLOOD PRESSURE: 78 MMHG | BODY MASS INDEX: 30.78 KG/M2

## 2023-12-26 DIAGNOSIS — E03.9 PRIMARY HYPOTHYROIDISM: ICD-10-CM

## 2023-12-26 DIAGNOSIS — Z00.00 ANNUAL PHYSICAL EXAM: Primary | ICD-10-CM

## 2023-12-26 DIAGNOSIS — E78.00 HYPERCHOLESTEREMIA: ICD-10-CM

## 2023-12-26 PROCEDURE — 99396 PREV VISIT EST AGE 40-64: CPT | Performed by: FAMILY MEDICINE

## 2023-12-26 NOTE — PROGRESS NOTES
Assessment/Plan:    1. Annual physical exam    2. Primary hypothyroidism  -     TSH, 3rd generation with Free T4 reflex; Future    3. Hypercholesteremia  -     Lipid Panel with Direct LDL reflex; Future  -     Comprehensive metabolic panel; Future  -     CBC and differential; Future        Depression Screening and Follow-up Plan: Patient was screened for depression during today's encounter. They screened negative with a PHQ-2 score of 0.    Tobacco Cessation Counseling: Tobacco cessation counseling was provided. The patient is sincerely urged to quit consumption of tobacco. He is not ready to quit tobacco. Medication options not discussed.          There are no Patient Instructions on file for this visit.    Return for Annual physical.    Subjective:      Patient ID: Eitan Luque is a 48 y.o. male.    Chief Complaint   Patient presents with    Physical Exam       HPI    Adult Annual Physical   Patient here for a comprehensive physical exam. The patient reports no problems.    Diet and Physical Activity  Diet/Nutrition: well balanced diet. Overeating during the holidays, skips breakfast  Exercise: 3-4 times a week on average. Less now after back surgery.     Depression Screening  PHQ-9 Depression Screening    PHQ-9:    Frequency of the following problems over the past two weeks:            General Health  Sleep: sleeps well.   Hearing: normal - bilateral.  Vision: no vision problems.   Dental: regular dental visits.       /GYN Health   [place]: 2018  PSA at age 49       The following portions of the patient's history were reviewed and updated as appropriate: allergies, current medications, past family history, past medical history, past social history, past surgical history and problem list.    Review of Systems      Constitutional:  Denies fever or chills   Eyes:  Denies double , blurry vision or eye pain  HENT:  Denies nasal congestion, sore throat or new hearing issues  Respiratory:  Denies cough or shortness of  "breath or wheezing  Cardiovascular:  Denies palpitations or chest pain  GI:  Denies abdominal pain, nausea, or vomiting, no loose stools, no reflux  Integument:  Denies rash , no open areas  Neurologic:  Denies headache or focal weakness, no dizziness  : no dysuria, or hematuria        Current Outpatient Medications   Medication Sig Dispense Refill    acetaminophen (TYLENOL) 325 mg tablet Take 3 tablets (975 mg total) by mouth every 8 (eight) hours as needed for mild pain  0    KRILL OIL PO Take by mouth Daily      levothyroxine 137 mcg tablet Take 1 tablet (137 mcg total) by mouth daily 90 tablet 3    Magnesium 100 MG CAPS Take by mouth Patient unsure of dosage      naproxen sodium (Aleve) 220 MG tablet Take 220 mg by mouth daily as needed for mild pain      VITAMIN D PO Take by mouth Daily       No current facility-administered medications for this visit.       Objective:    /78 (BP Location: Left arm, Patient Position: Sitting, Cuff Size: Adult)   Pulse 99   Temp 98.7 °F (37.1 °C)   Ht 5' 10\" (1.778 m)   Wt 97.5 kg (215 lb)   SpO2 99%   BMI 30.85 kg/m²        Physical Exam       Constitutional:  Well developed, well nourished, no acute distress, non-toxic appearance   Eyes:  PERRL, conjunctiva normal , non icteric sclera  HENT:  Atraumatic, oropharynx moist. Neck-  supple   Respiratory:  CTA b/l, normal breath sounds, no rales, no wheezing   Cardiovascular:  RRR, no murmurs, no LE edema b/l  GI:  Soft, nondistended, normal bowel sounds x 4, nontender, no organomegaly, no mass, no rebound, no guarding   Neurologic:  no focal deficits noted   Psychiatric:  Speech and behavior appropriate , AAO x 3        Horacio Rodriguez DO  "

## 2023-12-28 ENCOUNTER — APPOINTMENT (OUTPATIENT)
Dept: LAB | Age: 48
End: 2023-12-28
Payer: COMMERCIAL

## 2023-12-28 DIAGNOSIS — E78.00 HYPERCHOLESTEREMIA: ICD-10-CM

## 2023-12-28 DIAGNOSIS — E03.9 PRIMARY HYPOTHYROIDISM: ICD-10-CM

## 2023-12-28 LAB
ALBUMIN SERPL BCP-MCNC: 4.5 G/DL (ref 3.5–5)
ALP SERPL-CCNC: 71 U/L (ref 34–104)
ALT SERPL W P-5'-P-CCNC: 20 U/L (ref 7–52)
ANION GAP SERPL CALCULATED.3IONS-SCNC: 5 MMOL/L
AST SERPL W P-5'-P-CCNC: 19 U/L (ref 13–39)
BASOPHILS # BLD AUTO: 0.04 THOUSANDS/ÂΜL (ref 0–0.1)
BASOPHILS NFR BLD AUTO: 1 % (ref 0–1)
BILIRUB SERPL-MCNC: 0.26 MG/DL (ref 0.2–1)
BUN SERPL-MCNC: 19 MG/DL (ref 5–25)
CALCIUM SERPL-MCNC: 9.9 MG/DL (ref 8.4–10.2)
CHLORIDE SERPL-SCNC: 104 MMOL/L (ref 96–108)
CHOLEST SERPL-MCNC: 176 MG/DL
CO2 SERPL-SCNC: 29 MMOL/L (ref 21–32)
CREAT SERPL-MCNC: 0.86 MG/DL (ref 0.6–1.3)
EOSINOPHIL # BLD AUTO: 0.23 THOUSAND/ÂΜL (ref 0–0.61)
EOSINOPHIL NFR BLD AUTO: 4 % (ref 0–6)
ERYTHROCYTE [DISTWIDTH] IN BLOOD BY AUTOMATED COUNT: 12.7 % (ref 11.6–15.1)
GFR SERPL CREATININE-BSD FRML MDRD: 101 ML/MIN/1.73SQ M
GLUCOSE P FAST SERPL-MCNC: 97 MG/DL (ref 65–99)
HCT VFR BLD AUTO: 48.3 % (ref 36.5–49.3)
HDLC SERPL-MCNC: 45 MG/DL
HGB BLD-MCNC: 15.4 G/DL (ref 12–17)
IMM GRANULOCYTES # BLD AUTO: 0.03 THOUSAND/UL (ref 0–0.2)
IMM GRANULOCYTES NFR BLD AUTO: 1 % (ref 0–2)
LDLC SERPL CALC-MCNC: 92 MG/DL (ref 0–100)
LYMPHOCYTES # BLD AUTO: 1.03 THOUSANDS/ÂΜL (ref 0.6–4.47)
LYMPHOCYTES NFR BLD AUTO: 18 % (ref 14–44)
MCH RBC QN AUTO: 29.2 PG (ref 26.8–34.3)
MCHC RBC AUTO-ENTMCNC: 31.9 G/DL (ref 31.4–37.4)
MCV RBC AUTO: 92 FL (ref 82–98)
MONOCYTES # BLD AUTO: 0.35 THOUSAND/ÂΜL (ref 0.17–1.22)
MONOCYTES NFR BLD AUTO: 6 % (ref 4–12)
NEUTROPHILS # BLD AUTO: 4.05 THOUSANDS/ÂΜL (ref 1.85–7.62)
NEUTS SEG NFR BLD AUTO: 70 % (ref 43–75)
NRBC BLD AUTO-RTO: 0 /100 WBCS
PLATELET # BLD AUTO: 217 THOUSANDS/UL (ref 149–390)
PMV BLD AUTO: 10.9 FL (ref 8.9–12.7)
POTASSIUM SERPL-SCNC: 5.1 MMOL/L (ref 3.5–5.3)
PROT SERPL-MCNC: 7 G/DL (ref 6.4–8.4)
RBC # BLD AUTO: 5.28 MILLION/UL (ref 3.88–5.62)
SODIUM SERPL-SCNC: 138 MMOL/L (ref 135–147)
TRIGL SERPL-MCNC: 193 MG/DL
TSH SERPL DL<=0.05 MIU/L-ACNC: 0.88 UIU/ML (ref 0.45–4.5)
WBC # BLD AUTO: 5.73 THOUSAND/UL (ref 4.31–10.16)

## 2023-12-28 PROCEDURE — 85025 COMPLETE CBC W/AUTO DIFF WBC: CPT

## 2023-12-28 PROCEDURE — 84443 ASSAY THYROID STIM HORMONE: CPT

## 2023-12-28 PROCEDURE — 80053 COMPREHEN METABOLIC PANEL: CPT

## 2023-12-28 PROCEDURE — 80061 LIPID PANEL: CPT

## 2023-12-28 PROCEDURE — 36415 COLL VENOUS BLD VENIPUNCTURE: CPT

## 2024-02-15 DIAGNOSIS — E03.9 PRIMARY HYPOTHYROIDISM: ICD-10-CM

## 2024-02-15 RX ORDER — LEVOTHYROXINE SODIUM 137 UG/1
137 TABLET ORAL DAILY
Qty: 90 TABLET | Refills: 1 | Status: SHIPPED | OUTPATIENT
Start: 2024-02-15

## 2024-02-15 RX ORDER — LEVOTHYROXINE SODIUM 137 UG/1
137 TABLET ORAL DAILY
Qty: 90 TABLET | Refills: 1 | Status: SHIPPED | OUTPATIENT
Start: 2024-02-15 | End: 2024-02-15 | Stop reason: SDUPTHER

## 2024-04-19 ENCOUNTER — APPOINTMENT (OUTPATIENT)
Dept: RADIOLOGY | Age: 49
End: 2024-04-19
Payer: COMMERCIAL

## 2024-04-19 ENCOUNTER — OFFICE VISIT (OUTPATIENT)
Dept: URGENT CARE | Age: 49
End: 2024-04-19
Payer: COMMERCIAL

## 2024-04-19 VITALS
DIASTOLIC BLOOD PRESSURE: 80 MMHG | TEMPERATURE: 97.4 F | SYSTOLIC BLOOD PRESSURE: 120 MMHG | OXYGEN SATURATION: 98 % | RESPIRATION RATE: 18 BRPM | HEART RATE: 69 BPM

## 2024-04-19 DIAGNOSIS — M25.469 KNEE SWELLING: ICD-10-CM

## 2024-04-19 DIAGNOSIS — M25.469 KNEE SWELLING: Primary | ICD-10-CM

## 2024-04-19 PROCEDURE — 99213 OFFICE O/P EST LOW 20 MIN: CPT

## 2024-04-19 PROCEDURE — 73562 X-RAY EXAM OF KNEE 3: CPT

## 2024-04-19 NOTE — PROGRESS NOTES
[facility]        NAME: Eitan Luque is a 48 y.o. male  : 1975    MRN: [id number]  DATE: 2024  TIME: 3:23 PM    Assessment and Plan   Knee swelling [M25.469]  1. Knee swelling  XR knee 3 vw left non injury      X-rays reviewed, no acute abnormality noted, awaiting official read.   Rest, ice, compress and elevate for pain and swelling-Ace wrap applied in office.   Continue to alternate Tylenol and Ibuprofen as directed.   Follow up with orthopedics as directed.   Will hold off on antibiotics at this time pending orthopedic referral.       Patient Instructions   Swollen Joint   WHAT YOU NEED TO KNOW:   Joint swelling may occur in one or more joints. You may have other symptoms, such as pain, tenderness, or stiffness. A swollen joint may be caused by a variety of conditions such as arthritis, pseudogout, gout, tendinitis, or injury.  DISCHARGE INSTRUCTIONS:   Return to the emergency department if:   You cannot move your joint at all.     You have severe pain that does not get better with medicine.     Contact your healthcare provider if:   You have a fever.     You have redness or warmth over the joint.     The swelling does not decrease with treatment.     You have questions or concerns about your condition or care.     Medicines:   NSAIDs , such as ibuprofen, help decrease swelling, pain, and fever. This medicine is available with or without a doctor's order. NSAIDs can cause stomach bleeding or kidney problems in certain people. If you take blood thinner medicine, always ask your healthcare provider if NSAIDs are safe for you. Always read the medicine label and follow directions.     Take your medicine as directed.  Contact your healthcare provider if you think your medicine is not helping or if you have side effects. Tell your provider if you are allergic to any medicine. Keep a list of the medicines, vitamins, and herbs you take. Include the amounts, and when and why you take them.  Bring the list or the pill bottles to follow-up visits. Carry your medicine list with you in case of an emergency.     Follow up with your healthcare provider as directed:  Write down your questions so you remember to ask them during your visits.  Self-care:   Rest  your swollen joint. Avoid activities that make the swelling or pain worse. You may need to avoid putting weight on your joint while you have pain. Crutches or a walker can be used to avoid putting weight on joints in your lower body.     Apply ice  on your swollen joint for 15 to 20 minutes every hour or as directed. Use an ice pack, or put crushed ice in a plastic bag. Cover it with a towel. Ice helps prevent tissue damage and decreases swelling and pain.     Apply heat  on your swollen joint for 20 to 30 minutes every 2 hours for as many days as directed. Heat helps decrease pain.     Elevate  your swollen joint above the level of your heart as often as you can. This will help decrease swelling and pain. Prop your joint on pillows or blankets to keep it elevated comfortably.     © Copyright [employer] 2023 Information is for End User's use only and may not be sold, redistributed or otherwise used for commercial purposes.  The above information is an  only. It is not intended as medical advice for individual conditions or treatments. Talk to your doctor, nurse or pharmacist before following any medical regimen to see if it is safe and effective for you.       Follow up with PCP in 3-5 days.  Proceed to  ER if symptoms worsen.    Chief Complaint     Chief Complaint   Patient presents with    Knee Pain     Patient notes left knee pain for about 10 days. He states that it has worsened and is now swollen. He noticed yesterday that there seemed to be fluid and pain increased. He believes he has bursitis as he has had it multiple times before. He took 2 aleve at 0600 this morning         History of Present Illness       48 year old male with PMH  significant for bursitis of left knee (last episode 20 years ago), who presents for evaluation of left knee generalized swelling and tenderness over the past 10 days. He works as an  and reports working on his knees frequently. He takes two Advil in the morning at baseline, but has not taken anything additional for the pain. He denies direct trauma/injury, numbness/tingling, gait dysfunction. He endorses a slight limp. He has had to have his knee drained the previous times that this has happened.     Knee Pain   Pertinent negatives include no numbness.       Review of Systems   Review of Systems   Constitutional:  Negative for fatigue and fever.   HENT:  Negative for congestion, ear discharge, ear pain, postnasal drip, rhinorrhea, sinus pressure, sinus pain, sneezing and sore throat.    Eyes: Negative.  Negative for pain, discharge, redness and itching.   Respiratory: Negative.  Negative for apnea, cough, choking, chest tightness, shortness of breath, wheezing and stridor.    Cardiovascular: Negative.  Negative for chest pain and palpitations.   Gastrointestinal: Negative.  Negative for diarrhea, nausea and vomiting.   Endocrine: Negative.  Negative for polydipsia, polyphagia and polyuria.   Genitourinary: Negative.  Negative for decreased urine volume and flank pain.   Musculoskeletal:  Positive for arthralgias, gait problem and joint swelling. Negative for back pain, myalgias, neck pain and neck stiffness.   Skin: Negative.  Negative for color change and rash.   Allergic/Immunologic: Negative.  Negative for environmental allergies.   Neurological:  Negative for dizziness, facial asymmetry, light-headedness, numbness and headaches.   Hematological: Negative.  Negative for adenopathy.   Psychiatric/Behavioral: Negative.           Current Medications       Current Outpatient Medications:     acetaminophen (TYLENOL) 325 mg tablet, Take 3 tablets (975 mg total) by mouth every 8 (eight) hours as needed  for mild pain, Disp: , Rfl: 0    KRILL OIL PO, Take by mouth Daily, Disp: , Rfl:     levothyroxine 137 mcg tablet, Take 1 tablet (137 mcg total) by mouth daily, Disp: 90 tablet, Rfl: 1    Magnesium 100 MG CAPS, Take by mouth Patient unsure of dosage, Disp: , Rfl:     naproxen sodium (Aleve) 220 MG tablet, Take 220 mg by mouth daily as needed for mild pain, Disp: , Rfl:     VITAMIN D PO, Take by mouth Daily, Disp: , Rfl:     Current Allergies     Allergies as of 04/19/2024    (No Known Allergies)            The following portions of the patient's history were reviewed and updated as appropriate: allergies, current medications, past family history, past medical history, past social history, past surgical history and problem list.     Past Medical History:   Diagnosis Date    Disease of thyroid gland     Gastroesophageal reflux disease 10/29/2020    GERD (gastroesophageal reflux disease)     Kidney stone     R side- for laser/stent today 10/29/2020    Pre-op examination 5/2/2023    Smoking 10/29/2020    Wears contact lenses        Past Surgical History:   Procedure Laterality Date    COLONOSCOPY      FL RETROGRADE PYELOGRAM  10/29/2020    KIDNEY STONE SURGERY  2015    [facility] CYSTO/URETERO W/LITHOTRIPSY &INDWELL STENT INSRT Right 10/29/2020    Procedure: CYSTO, URETEROSCOPY W/LASER lithotripsy, RETROGRADE PYELOGRAM, stone basket extraction, insertion double J STENT;  Surgeon: Pastor Wasserman MD;  Location: [facility];  Service: Urology    VA LAMNOTMY INCL W/DCMPRSN NRV ROOT 1 INTRSPC LUMBR Left 5/12/2023    Procedure: Left L3/4 and Left L4/5 hemilaminotomy, foraminotomy, discectomy;  Surgeon: Craig Robert Goldberg, MD;  Location: [facility];  Service: Neurosurgery    VA LITHOTRIPSY XTRCORP SHOCK WAVE Right 7/17/2020    Procedure: LITHOTRIPSY EXTRACORPORAL SHOCKWAVE (ESWL);  Surgeon: Pastor Wasserman MD;  Location: [facility];  Service: Urology       Family History   Problem Relation Age of Onset    No Known Problems Mother      Cancer Father         unknown    Thyroid disease Father     Colon cancer Maternal Grandfather     Completed Suicide  Brother          Medications have been verified.        Objective   /80   Pulse 69   Temp (!) 97.4 °F (36.3 °C)   Resp 18   SpO2 98%        Physical Exam     Physical Exam  Vitals reviewed.   Constitutional:       General: He is not in acute distress.     Appearance: Normal appearance. He is not ill-appearing, toxic-appearing or diaphoretic.      Interventions: He is not intubated.  HENT:      Head: Normocephalic and atraumatic.      Right Ear: External ear normal. There is no impacted cerumen.      Left Ear: External ear normal. There is no impacted cerumen.      Nose: Nose normal. No congestion or rhinorrhea.      Mouth/Throat:      Mouth: Mucous membranes are moist.      Pharynx: Oropharynx is clear. Uvula midline. No pharyngeal swelling, oropharyngeal exudate, posterior oropharyngeal erythema or uvula swelling.      Tonsils: No tonsillar exudate or tonsillar abscesses. 1+ on the right. 1+ on the left.   Eyes:      Extraocular Movements: Extraocular movements intact.      Conjunctiva/sclera: Conjunctivae normal.      Pupils: Pupils are equal, round, and reactive to light.   Cardiovascular:      Rate and Rhythm: Normal rate and regular rhythm.      Pulses: Normal pulses.      Heart sounds: Normal heart sounds, S1 normal and S2 normal. Heart sounds not distant. No murmur heard.     No friction rub. No gallop.   Pulmonary:      Effort: Pulmonary effort is normal. No tachypnea, bradypnea, accessory muscle usage, prolonged expiration, respiratory distress or retractions. He is not intubated.      Breath sounds: Normal breath sounds. No stridor, decreased air movement or transmitted upper airway sounds. No decreased breath sounds, wheezing, rhonchi or rales.   Chest:      Chest wall: No tenderness.   Musculoskeletal:         General: Normal range of motion.      Cervical back: Normal range  of motion and neck supple. No rigidity or tenderness.      Left knee: Swelling present. No deformity, effusion, erythema, ecchymosis, lacerations, bony tenderness or crepitus. Normal range of motion. Tenderness present. No medial joint line, lateral joint line, MCL, LCL, ACL, PCL or patellar tendon tenderness. No LCL laxity, MCL laxity, ACL laxity or PCL laxity.Normal alignment, normal meniscus and normal patellar mobility. Normal pulse.        Legs:       Comments: Some mild anterior left knee TTP  No redness, warmth, signs of infection on exam   Lymphadenopathy:      Cervical: No cervical adenopathy.   Skin:     General: Skin is warm and dry.      Capillary Refill: Capillary refill takes less than 2 seconds.      Findings: No erythema.   Neurological:      General: No focal deficit present.      Mental Status: He is alert.   Psychiatric:         Mood and Affect: Mood normal.

## 2024-04-19 NOTE — PATIENT INSTRUCTIONS
X-rays reviewed, no acute abnormality noted, awaiting official read.   Rest, ice, compress and elevate for pain and swelling-Ace wrap applied in office.   Continue to alternate Tylenol and Ibuprofen as directed.   Follow up with orthopedics as directed.   Will hold off on antibiotics at this time pending orthopedic referral.

## 2024-04-22 ENCOUNTER — OFFICE VISIT (OUTPATIENT)
Dept: OBGYN CLINIC | Facility: CLINIC | Age: 49
End: 2024-04-22
Payer: COMMERCIAL

## 2024-04-22 VITALS
BODY MASS INDEX: 30.78 KG/M2 | HEART RATE: 94 BPM | SYSTOLIC BLOOD PRESSURE: 116 MMHG | WEIGHT: 215 LBS | HEIGHT: 70 IN | DIASTOLIC BLOOD PRESSURE: 80 MMHG

## 2024-04-22 DIAGNOSIS — M25.469 KNEE SWELLING: ICD-10-CM

## 2024-04-22 DIAGNOSIS — M70.42 PREPATELLAR BURSITIS OF LEFT KNEE: Primary | ICD-10-CM

## 2024-04-22 PROCEDURE — 20610 DRAIN/INJ JOINT/BURSA W/O US: CPT | Performed by: STUDENT IN AN ORGANIZED HEALTH CARE EDUCATION/TRAINING PROGRAM

## 2024-04-22 PROCEDURE — 99204 OFFICE O/P NEW MOD 45 MIN: CPT | Performed by: STUDENT IN AN ORGANIZED HEALTH CARE EDUCATION/TRAINING PROGRAM

## 2024-04-22 NOTE — PROGRESS NOTES
Orthopaedics Office Visit - new  Patient Visit    ASSESSMENT/PLAN:    Assessment:   Hemorrhagic pre-patellar bursitis       Plan:   X-rays reviewed and discussed with patient revealing prepatellar swelling of the soft tissues.  No fractures dislocations.  No intra-articular effusion  Pt to be weightbearing as tolerated to LLE  ROM as tolerated to LLE  Patient has signs and symptoms send clinical presentation of prepatellar bursitis.  We discussed compression and anti-inflammatories or aspiration of his prepatellar bursa.  The patient reports that he has had multiple prepatellar bursa aspirations in the past and would like to proceed with a prepatellar bursa aspiration.  No signs of septic prepatellar bursitis  Pt would like to proceed with bedside aspiration, pt was tolerated the procedure well.  15 cc of serosanguineous fluid aspirated  Pt to continue at home analgesic regimen with Tylenol   Pt to follow up as needed for recurrent prepatellar bursitis or increasing pain    To Do Next Visit:      _____________________________________________________  CHIEF COMPLAINT:  Chief Complaint   Patient presents with    Left Knee - Pain, Swelling         SUBJECTIVE:  Eitan Luque is a 48 y.o. male who presents with left knee pain. Pt denies any trauma or surgeries to the knee.  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  No history of septic bursitis.  He endorses pain with knee being held in flexion and kneeling. He states it has gotten better over the past few days with compression wraps over the anterior aspect of the knee. He offers no additional complaints at this time. He has been using ace wraps to compress the knee which offers mild relief.  He denies any traumatic injuries.  Denies any fevers or chills.  Denies any purulence or drainage from the prepatellar bursa  PAST MEDICAL HISTORY:  Past Medical History:    Diagnosis Date    Disease of thyroid gland     Gastroesophageal reflux disease 10/29/2020    GERD (gastroesophageal reflux disease)     Kidney stone     R side- for laser/stent today 10/29/2020    Pre-op examination 5/2/2023    Smoking 10/29/2020    Wears contact lenses        PAST SURGICAL HISTORY:  Past Surgical History:   Procedure Laterality Date    COLONOSCOPY      FL RETROGRADE PYELOGRAM  10/29/2020    KIDNEY STONE SURGERY  2015    [place] CYSTO/URETERO W/LITHOTRIPSY &INDWELL STENT INSRT Right 10/29/2020    Procedure: CYSTO, URETEROSCOPY W/LASER lithotripsy, RETROGRADE PYELOGRAM, stone basket extraction, insertion double J STENT;  Surgeon: Pastor Wasserman MD;  [facility];  Service: Urology    AZ LAMNOTMY INCL W/DCMPRSN NRV ROOT 1 INTRSPC LUMBR Left 5/12/2023    Procedure: Left L3/4 and Left L4/5 hemilaminotomy, foraminotomy, discectomy;  Surgeon: Craig Robert Goldberg, MD;  [facility];  Service: Neurosurgery    AZ LITHOTRIPSY XTRCORP SHOCK WAVE Right 7/17/2020    Procedure: LITHOTRIPSY EXTRACORPORAL SHOCKWAVE (ESWL);  Surgeon: Pastor Wasserman MD;  [facility];  Service: Urology       FAMILY HISTORY:  Family History   Problem Relation Age of Onset    No Known Problems Mother     Cancer Father         unknown    Thyroid disease Father     Colon cancer Maternal Grandfather     Completed Suicide  Brother        SOCIAL HISTORY:  Social History     Tobacco Use    Smoking status: Every Day     Current packs/day: 1.00     Average packs/day: 1 pack/day for 31.3 years (31.3 ttl pk-yrs)     Types: Cigarettes     Start date: 1993    Smokeless tobacco: Never    Tobacco comments:     pt reports 3/4 of a ppd now   Vaping Use    Vaping status: Never Used   Substance Use Topics    Alcohol use: Yes     Comment: 12 drinks per year    Drug use: No       MEDICATIONS:    Current Outpatient Medications:     acetaminophen (TYLENOL) 325 mg tablet, Take 3 tablets (975 mg total) by mouth every 8 (eight) hours  "as needed for mild pain, Disp: , Rfl: 0    KRILL OIL PO, Take by mouth Daily, Disp: , Rfl:     levothyroxine 137 mcg tablet, Take 1 tablet (137 mcg total) by mouth daily, Disp: 90 tablet, Rfl: 1    Magnesium 100 MG CAPS, Take by mouth Patient unsure of dosage, Disp: , Rfl:     naproxen sodium (Aleve) 220 MG tablet, Take 220 mg by mouth daily as needed for mild pain, Disp: , Rfl:     VITAMIN D PO, Take by mouth Daily, Disp: , Rfl:     ALLERGIES:  No Known Allergies    REVIEW OF SYSTEMS:  MSK: Left knee pain  Neuro: None  Pertinent items are otherwise noted in HPI.  A comprehensive review of systems was otherwise negative.    LABS:  HgA1c:   Lab Results   Component Value Date    HGBA1C 5.5 04/13/2023     BMP:   Lab Results   Component Value Date    GLUCOSE 91 08/14/2015    CALCIUM 9.9 12/28/2023     08/14/2015    K 5.1 12/28/2023    CO2 29 12/28/2023     12/28/2023    BUN 19 12/28/2023    CREATININE 0.86 12/28/2023     CBC: No components found for: \"CBC\"    _____________________________________________________  PHYSICAL EXAMINATION:  Vital signs: /80   Pulse 94   Ht 5' 10\" (1.778 m)   Wt 97.5 kg (215 lb)   BMI 30.85 kg/m²   General: No acute distress, awake and alert  Psychiatric: Mood and affect appear appropriate  HEENT: Trachea Midline, No torticollis, no apparent facial trauma  Cardiovascular: No audible murmurs; Extremities appear perfused  Pulmonary: No audible wheezing or stridor  Skin: No open lesions; see further details (if any) below    MUSCULOSKELETAL EXAMINATION:  Extremities:  left lower extremity     The left lower extremity was exposed and inspected. Visible skin intact without erythema, ecchymosis, effusion or obvious osseous deformity.  The patient has callus formation over the bilateral patellas.  The patient has no open wounds.  There is no induration or sinus tract surrounding the patella bursa.  There is a fluctuant fluid collection directly underneath the skin overlying the " patella consistent with prepatellar bursitis.  TTP over the anterior aspect of the knee.  No tenderness over the medial lateral joint lines.  Knee stable to varus and valgus stress, anterior and posterior drawer testing. Pt able to range from 0 to 100 degrees of flexion before anterior knee pain causes him stress. Sensation intact to superficial peroneal, deep peroneal, sural, saphenous, plantar nerve distributions. Motor intact to extensor hallux longus, tibialis anterior, gastrocnemius muscles, extensor mechanism intact. Limb is well perfused. Brisk capillary refill in all 5 digits. Compartments soft and compressible.       _____________________________________________________  STUDIES REVIEWED:  I personally reviewed the images and interpretation is as follows:  X-rays of the left knee demonstrate prepatellar fluid collection and effusion with no intra-articular effusion.  No acute fractures or dislocations.  No osseous lesions    PROCEDURES PERFORMED:  Large joint arthrocentesis: L patellar bursa  Universal Protocol:  Consent: Verbal consent obtained.  Risks and benefits: risks, benefits and alternatives were discussed  Consent given by: patient  Patient understanding: patient states understanding of the procedure being performed  Site marked: the operative site was marked  Radiology Images displayed and confirmed. If images not available, report reviewed: imaging studies available  Patient identity confirmed: verbally with patient  Supporting Documentation  Indications: joint swelling and pain   Procedure Details  Location: knee - L patellar bursa  Preparation: Patient was prepped and draped in the usual sterile fashion  Needle size: 22 G  Ultrasound guidance: no  Approach: anterolateral    Aspirate amount: 15 mL  Aspirate: blood-tinged  Patient tolerance: patient tolerated the procedure well with no immediate complications  Dressing:  Sterile dressing applied    15 cc aspirated from the prepatellar bursa.   Serosanguineous          Hilario Hobbs PA-C

## 2024-05-28 ENCOUNTER — OFFICE VISIT (OUTPATIENT)
Dept: OBGYN CLINIC | Facility: CLINIC | Age: 49
End: 2024-05-28
Payer: COMMERCIAL

## 2024-05-28 VITALS
SYSTOLIC BLOOD PRESSURE: 112 MMHG | WEIGHT: 215 LBS | HEIGHT: 70 IN | HEART RATE: 91 BPM | BODY MASS INDEX: 30.78 KG/M2 | DIASTOLIC BLOOD PRESSURE: 71 MMHG

## 2024-05-28 DIAGNOSIS — M70.42 PREPATELLAR BURSITIS OF LEFT KNEE: Primary | ICD-10-CM

## 2024-05-28 PROCEDURE — 99212 OFFICE O/P EST SF 10 MIN: CPT | Performed by: STUDENT IN AN ORGANIZED HEALTH CARE EDUCATION/TRAINING PROGRAM

## 2024-05-28 NOTE — PROGRESS NOTES
Orthopaedics Office Visit - new  Patient Visit    ASSESSMENT/PLAN:    Assessment:   Hemorrhagic pre-patellar bursitis with resolution of bursitis       Plan:   Patient with decompressed pre-patellar bursa on the left   Encouraged to use compressive stockings for bilateral lower extremity swelling. No concern for DVT  Pt to be weightbearing as tolerated to LLE  ROM as tolerated to LLE  Pt to continue at home analgesic regimen with Tylenol   Pt to follow up as needed for recurrent prepatellar bursitis or increasing pain  Encouraged to follow up with PCP for bilateral lower extremity swelling     To Do Next Visit:      _____________________________________________________  CHIEF COMPLAINT:  Chief Complaint   Patient presents with    Left Knee - Follow-up, Pain     Lower leg swelling         SUBJECTIVE:  Eitan Luque is a 48 y.o. male who presents with left knee pain. Pt denies any trauma or surgeries to the knee.  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  No history of septic bursitis.  He endorses pain with knee being held in flexion and kneeling. He states it has gotten better over the past few days with compression wraps over the anterior aspect of the knee. He offers no additional complaints at this time. He has been using ace wraps to compress the knee which offers mild relief.  He denies any traumatic injuries.  Denies any fevers or chills.  Denies any purulence or drainage from the prepatellar bursa    Interval history 5/28/2024  Presents one month after decompression of his left prepatellar bursa. He reports the knee feels better but his left lower extremity swells distal to the knee after kneeling on his knee for work. He reports chronic numbness of the anterior lower extremity due to lumbar herniated discs. He denies knee swelling, new injury, fevers, chills, or shortness of breath.       PAST MEDICAL  HISTORY:  Past Medical History:   Diagnosis Date    Disease of thyroid gland     Gastroesophageal reflux disease 10/29/2020    GERD (gastroesophageal reflux disease)     Kidney stone     R side- for laser/stent today 10/29/2020    Pre-op examination 5/2/2023    Smoking 10/29/2020    Wears contact lenses        PAST SURGICAL HISTORY:  Past Surgical History:   Procedure Laterality Date    COLONOSCOPY      FL RETROGRADE PYELOGRAM  10/29/2020    KIDNEY STONE SURGERY  2015    [place] CYSTO/URETERO W/LITHOTRIPSY &INDWELL STENT INSRT Right 10/29/2020    Procedure: CYSTO, URETEROSCOPY W/LASER lithotripsy, RETROGRADE PYELOGRAM, stone basket extraction, insertion double J STENT;  Surgeon: Pastor Wasserman MD;  [facility];  Service: Urology    MA LAMNOTMY INCL W/DCMPRSN NRV ROOT 1 INTRSPC LUMBR Left 5/12/2023    Procedure: Left L3/4 and Left L4/5 hemilaminotomy, foraminotomy, discectomy;  Surgeon: Craig Robert Goldberg, MD;  [facility];  Service: Neurosurgery    MA LITHOTRIPSY XTRCORP SHOCK WAVE Right 7/17/2020    Procedure: LITHOTRIPSY EXTRACORPORAL SHOCKWAVE (ESWL);  Surgeon: Pastor Wasserman MD;  [facility];  Service: Urology       FAMILY HISTORY:  Family History   Problem Relation Age of Onset    No Known Problems Mother     Cancer Father         unknown    Thyroid disease Father     Colon cancer Maternal Grandfather     Completed Suicide  Brother        SOCIAL HISTORY:  Social History     Tobacco Use    Smoking status: Every Day     Current packs/day: 1.00     Average packs/day: 1 pack/day for 31.4 years (31.4 ttl pk-yrs)     Types: Cigarettes     Start date: 1993    Smokeless tobacco: Never    Tobacco comments:     pt reports 3/4 of a ppd now   Vaping Use    Vaping status: Never Used   Substance Use Topics    Alcohol use: Yes     Comment: 12 drinks per year    Drug use: No       MEDICATIONS:    Current Outpatient Medications:     acetaminophen (TYLENOL) 325 mg tablet, Take 3 tablets (975 mg  "total) by mouth every 8 (eight) hours as needed for mild pain, Disp: , Rfl: 0    KRILL OIL PO, Take by mouth Daily, Disp: , Rfl:     levothyroxine 137 mcg tablet, Take 1 tablet (137 mcg total) by mouth daily, Disp: 90 tablet, Rfl: 1    Magnesium 100 MG CAPS, Take by mouth Patient unsure of dosage, Disp: , Rfl:     naproxen sodium (Aleve) 220 MG tablet, Take 220 mg by mouth daily as needed for mild pain, Disp: , Rfl:     VITAMIN D PO, Take by mouth Daily, Disp: , Rfl:     ALLERGIES:  No Known Allergies    REVIEW OF SYSTEMS:  MSK: Left knee swelling   Neuro: None  Pertinent items are otherwise noted in HPI.  A comprehensive review of systems was otherwise negative.    LABS:  HgA1c:   Lab Results   Component Value Date    HGBA1C 5.5 04/13/2023     BMP:   Lab Results   Component Value Date    GLUCOSE 91 08/14/2015    CALCIUM 9.9 12/28/2023     08/14/2015    K 5.1 12/28/2023    CO2 29 12/28/2023     12/28/2023    BUN 19 12/28/2023    CREATININE 0.86 12/28/2023     CBC: No components found for: \"CBC\"    _____________________________________________________  PHYSICAL EXAMINATION:  Vital signs: /71   Pulse 91   Ht 5' 10\" (1.778 m)   Wt 97.5 kg (215 lb)   BMI 30.85 kg/m²   General: No acute distress, awake and alert  Psychiatric: Mood and affect appear appropriate  HEENT: Trachea Midline, No torticollis, no apparent facial trauma  Cardiovascular: No audible murmurs; Extremities appear perfused  Pulmonary: No audible wheezing or stridor  Skin: No open lesions; see further details (if any) below    MUSCULOSKELETAL EXAMINATION:  Extremities:  left lower extremity     The left lower extremity was exposed and inspected. Visible skin intact without erythema, ecchymosis, effusion or obvious osseous deformity. The prepatellar bursa is decompressed. There is mild non-pitting edema of the lower extremity. No TTP . Pt able to range from full extension to 125 degrees of flexion.  Negative Homans.no skin changes " consistent with deep vein thrombosis.  Mild edema on the contralateral side.  Sensation intact to superficial peroneal, deep peroneal, sural, saphenous, plantar nerve distributions. Motor intact to extensor hallux longus, tibialis anterior, gastrocnemius muscles, extensor mechanism intact. Limb is well perfused. Brisk capillary refill in all 5 digits. Compartments soft and compressible.        _____________________________________________________  STUDIES REVIEWED:  No new studies to review     PROCEDURES PERFORMED:  Procedures     Blaze Banuelos MD

## 2024-08-12 DIAGNOSIS — E03.9 PRIMARY HYPOTHYROIDISM: ICD-10-CM

## 2024-08-12 NOTE — TELEPHONE ENCOUNTER
Reason for call:   [x] Refill   [] Prior Auth  [] Other:     Office:   [x] PCP/Provider -   [] Specialty/Provider -Hoarcio Rodriguez/[facility] [place]      Medication: Levothyrozine    Dose/Frequency: 137 mcg    Quantity: #90    Pharmacy: [address] [place]    Does the patient have enough for 3 days?   [x] Yes   [] No - Send as HP to POD

## 2024-08-13 RX ORDER — LEVOTHYROXINE SODIUM 137 UG/1
137 TABLET ORAL DAILY
Qty: 90 TABLET | Refills: 1 | Status: SHIPPED | OUTPATIENT
Start: 2024-08-13

## 2024-12-11 ENCOUNTER — TELEPHONE (OUTPATIENT)
Age: 49
End: 2024-12-11

## 2024-12-11 NOTE — TELEPHONE ENCOUNTER
Patient is requesting [facility] scripts for his 12/27 physical. Anay place scripts in his chart. Patient goes to [facility]'s labs.  Thank You!!

## 2024-12-12 DIAGNOSIS — Z12.5 SCREENING PSA (PROSTATE SPECIFIC ANTIGEN): ICD-10-CM

## 2024-12-12 DIAGNOSIS — E03.9 PRIMARY HYPOTHYROIDISM: Primary | ICD-10-CM

## 2024-12-12 DIAGNOSIS — E78.00 HYPERCHOLESTEREMIA: ICD-10-CM

## 2024-12-14 ENCOUNTER — APPOINTMENT (OUTPATIENT)
Dept: LAB | Age: 49
End: 2024-12-14
Payer: COMMERCIAL

## 2024-12-14 DIAGNOSIS — E78.00 HYPERCHOLESTEREMIA: ICD-10-CM

## 2024-12-14 DIAGNOSIS — Z12.5 SCREENING PSA (PROSTATE SPECIFIC ANTIGEN): ICD-10-CM

## 2024-12-14 DIAGNOSIS — E03.9 PRIMARY HYPOTHYROIDISM: ICD-10-CM

## 2024-12-14 LAB
ALBUMIN SERPL BCG-MCNC: 4.5 G/DL (ref 3.5–5)
ALP SERPL-CCNC: 71 U/L (ref 34–104)
ALT SERPL W P-5'-P-CCNC: 21 U/L (ref 7–52)
ANION GAP SERPL CALCULATED.3IONS-SCNC: 6 MMOL/L (ref 4–13)
AST SERPL W P-5'-P-CCNC: 22 U/L (ref 13–39)
BASOPHILS # BLD AUTO: 0.02 THOUSANDS/ÂΜL (ref 0–0.1)
BASOPHILS NFR BLD AUTO: 0 % (ref 0–1)
BILIRUB SERPL-MCNC: 0.31 MG/DL (ref 0.2–1)
BUN SERPL-MCNC: 13 MG/DL (ref 5–25)
CALCIUM SERPL-MCNC: 9.2 MG/DL (ref 8.4–10.2)
CHLORIDE SERPL-SCNC: 104 MMOL/L (ref 96–108)
CHOLEST SERPL-MCNC: 232 MG/DL (ref ?–200)
CO2 SERPL-SCNC: 27 MMOL/L (ref 21–32)
CREAT SERPL-MCNC: 0.74 MG/DL (ref 0.6–1.3)
EOSINOPHIL # BLD AUTO: 0.2 THOUSAND/ÂΜL (ref 0–0.61)
EOSINOPHIL NFR BLD AUTO: 4 % (ref 0–6)
ERYTHROCYTE [DISTWIDTH] IN BLOOD BY AUTOMATED COUNT: 12.6 % (ref 11.6–15.1)
GFR SERPL CREATININE-BSD FRML MDRD: 107 ML/MIN/1.73SQ M
GLUCOSE P FAST SERPL-MCNC: 93 MG/DL (ref 65–99)
HCT VFR BLD AUTO: 45.8 % (ref 36.5–49.3)
HDLC SERPL-MCNC: 40 MG/DL
HGB BLD-MCNC: 14.8 G/DL (ref 12–17)
IMM GRANULOCYTES # BLD AUTO: 0.04 THOUSAND/UL (ref 0–0.2)
IMM GRANULOCYTES NFR BLD AUTO: 1 % (ref 0–2)
LDLC SERPL CALC-MCNC: 142 MG/DL (ref 0–100)
LYMPHOCYTES # BLD AUTO: 0.97 THOUSANDS/ÂΜL (ref 0.6–4.47)
LYMPHOCYTES NFR BLD AUTO: 18 % (ref 14–44)
MCH RBC QN AUTO: 29 PG (ref 26.8–34.3)
MCHC RBC AUTO-ENTMCNC: 32.3 G/DL (ref 31.4–37.4)
MCV RBC AUTO: 90 FL (ref 82–98)
MONOCYTES # BLD AUTO: 0.41 THOUSAND/ÂΜL (ref 0.17–1.22)
MONOCYTES NFR BLD AUTO: 8 % (ref 4–12)
NEUTROPHILS # BLD AUTO: 3.74 THOUSANDS/ÂΜL (ref 1.85–7.62)
NEUTS SEG NFR BLD AUTO: 69 % (ref 43–75)
NRBC BLD AUTO-RTO: 0 /100 WBCS
PLATELET # BLD AUTO: 226 THOUSANDS/UL (ref 149–390)
PMV BLD AUTO: 11.2 FL (ref 8.9–12.7)
POTASSIUM SERPL-SCNC: 4.7 MMOL/L (ref 3.5–5.3)
PROT SERPL-MCNC: 7 G/DL (ref 6.4–8.4)
PSA SERPL-MCNC: 0.29 NG/ML (ref 0–4)
RBC # BLD AUTO: 5.11 MILLION/UL (ref 3.88–5.62)
SODIUM SERPL-SCNC: 137 MMOL/L (ref 135–147)
TRIGL SERPL-MCNC: 249 MG/DL (ref ?–150)
TSH SERPL DL<=0.05 MIU/L-ACNC: 1.88 UIU/ML (ref 0.45–4.5)
WBC # BLD AUTO: 5.38 THOUSAND/UL (ref 4.31–10.16)

## 2024-12-14 PROCEDURE — 80061 LIPID PANEL: CPT

## 2024-12-14 PROCEDURE — 84443 ASSAY THYROID STIM HORMONE: CPT

## 2024-12-14 PROCEDURE — 85025 COMPLETE CBC W/AUTO DIFF WBC: CPT

## 2024-12-14 PROCEDURE — G0103 PSA SCREENING: HCPCS

## 2024-12-14 PROCEDURE — 36415 COLL VENOUS BLD VENIPUNCTURE: CPT

## 2024-12-14 PROCEDURE — 80053 COMPREHEN METABOLIC PANEL: CPT

## 2024-12-16 ENCOUNTER — RESULTS FOLLOW-UP (OUTPATIENT)
Dept: INTERNAL MEDICINE CLINIC | Facility: OTHER | Age: 49
End: 2024-12-16

## 2024-12-19 ENCOUNTER — RA CDI HCC (OUTPATIENT)
Dept: OTHER | Facility: HOSPITAL | Age: 49
End: 2024-12-19

## 2024-12-19 PROBLEM — U07.1 COVID-19: Status: RESOLVED | Noted: 2022-04-27 | Resolved: 2024-12-19

## 2024-12-19 PROBLEM — Z00.00 ANNUAL PHYSICAL EXAM: Status: RESOLVED | Noted: 2023-12-26 | Resolved: 2024-12-19

## 2024-12-27 ENCOUNTER — TRANSCRIBE ORDERS (OUTPATIENT)
Dept: SLEEP CENTER | Facility: CLINIC | Age: 49
End: 2024-12-27

## 2024-12-27 ENCOUNTER — OFFICE VISIT (OUTPATIENT)
Dept: INTERNAL MEDICINE CLINIC | Age: 49
End: 2024-12-27
Payer: COMMERCIAL

## 2024-12-27 VITALS
HEART RATE: 79 BPM | SYSTOLIC BLOOD PRESSURE: 120 MMHG | BODY MASS INDEX: 31.75 KG/M2 | WEIGHT: 221.8 LBS | OXYGEN SATURATION: 98 % | TEMPERATURE: 97.5 F | HEIGHT: 70 IN | DIASTOLIC BLOOD PRESSURE: 86 MMHG

## 2024-12-27 DIAGNOSIS — K64.2 GRADE III HEMORRHOIDS: ICD-10-CM

## 2024-12-27 DIAGNOSIS — E03.9 PRIMARY HYPOTHYROIDISM: ICD-10-CM

## 2024-12-27 DIAGNOSIS — R06.83 SNORING: Primary | ICD-10-CM

## 2024-12-27 DIAGNOSIS — M54.16 LUMBAR BACK PAIN WITH RADICULOPATHY AFFECTING LEFT LOWER EXTREMITY: ICD-10-CM

## 2024-12-27 DIAGNOSIS — E78.00 HYPERCHOLESTEREMIA: Primary | ICD-10-CM

## 2024-12-27 DIAGNOSIS — Z12.5 SCREENING PSA (PROSTATE SPECIFIC ANTIGEN): ICD-10-CM

## 2024-12-27 DIAGNOSIS — Z12.2 SCREENING FOR LUNG CANCER: ICD-10-CM

## 2024-12-27 DIAGNOSIS — Z00.00 ANNUAL PHYSICAL EXAM: ICD-10-CM

## 2024-12-27 DIAGNOSIS — R06.83 SNORING: ICD-10-CM

## 2024-12-27 DIAGNOSIS — E78.00 HIGH CHOLESTEROL: ICD-10-CM

## 2024-12-27 DIAGNOSIS — M77.12 LATERAL EPICONDYLITIS OF LEFT ELBOW: ICD-10-CM

## 2024-12-27 DIAGNOSIS — F17.200 SMOKING: Chronic | ICD-10-CM

## 2024-12-27 PROCEDURE — 99396 PREV VISIT EST AGE 40-64: CPT | Performed by: FAMILY MEDICINE

## 2024-12-27 PROCEDURE — 99214 OFFICE O/P EST MOD 30 MIN: CPT | Performed by: FAMILY MEDICINE

## 2024-12-27 RX ORDER — BUPROPION HYDROCHLORIDE 150 MG/1
150 TABLET ORAL EVERY MORNING
Qty: 90 TABLET | Refills: 1 | Status: SHIPPED | OUTPATIENT
Start: 2024-12-27 | End: 2025-12-22

## 2024-12-27 NOTE — PROGRESS NOTES
Assessment/Plan:    1. Hypercholesteremia  -     Lipid Panel with Direct LDL reflex; Future; Expected date: 06/27/2025  -     Lipid Panel with Direct LDL reflex; Future; Expected date: 12/15/2025  -     Comprehensive metabolic panel; Future; Expected date: 12/15/2025  -     CBC and differential; Future; Expected date: 12/15/2025  2. Screening for lung cancer  -     CT lung screening program; Future; Expected date: 12/27/2024  3. Primary hypothyroidism  -     TSH, 3rd generation with Free T4 reflex; Future; Expected date: 12/15/2025  4. Lumbar back pain with radiculopathy affecting left lower extremity  5. High cholesterol  6. Snoring  -     Ambulatory Referral to Sleep Medicine; Future  7. Screening PSA (prostate specific antigen)  -     PSA, Total Screen; Future; Expected date: 12/15/2025  8. Smoking  -     buPROPion (WELLBUTRIN XL) 150 mg 24 hr tablet; Take 1 tablet (150 mg total) by mouth every morning  9. Grade III hemorrhoids  10. Lateral epicondylitis of left elbow          There are no Patient Instructions on file for this visit.    No follow-ups on file.    Subjective:      Patient ID: Eitan Luque is a 49 y.o. male.    Chief Complaint   Patient presents with    Physical Exam     Physical. Labs done 12/14/24         Depression screening        HPI    Elbow pain- left, started several months ago, works in [employer], he is right-handed.  Cannot remember any repetitive movements or trauma.  Does do heavy lifting at time.  Was improving and then got worse again.  Pain is described as dull and sharp intermittently.  Worse when there is inactivity at his elbow such as when he wakes up in the morning.  No swelling no no other issues.  He is able to work and it does not affect his activities of daily living    Hypothyroidism-well-controlled on medications, no hypothyroid symptoms, compliant    High cholesterol -not on medications, most recent lipid levels have dramatically increased and patient is not sure why or what  happened.  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  He did not replace that type of exercise with anything else.  He is not sure if he has family history of high cholesterol      Adult Annual Physical   Patient here for a comprehensive physical exam. The patient reports no problems.    Diet and Physical Activity  Diet/Nutrition: well balanced diet.  Could be better per patient  Exercise: no formal exercise.      Depression Screening  PHQ-9 Depression Screening    PHQ-9:    Frequency of the following problems over the past two weeks:            General Health  Sleep: snores loudly.   Hearing: normal - bilateral.  Vision: no vision problems.   Dental: regular dental visits.       /GYN Health  PSA UTD normal  [place] 2018- dr torre- unsure of repeat          The following portions of the patient's history were reviewed and updated as appropriate: allergies, current medications, past family history, past medical history, past social history, past surgical history and problem list.    Review of Systems      Constitutional:  Denies fever or chills   Eyes:  Denies double , blurry vision or eye pain  HENT:  Denies nasal congestion, sore throat or new hearing issues  Respiratory:  Denies cough or shortness of breath or wheezing  Cardiovascular:  Denies palpitations or chest pain  GI:  Denies abdominal pain, nausea, or vomiting, no loose stools, no reflux  Integument:  Denies rash , no open areas  Neurologic:  Denies headache or focal weakness, no dizziness  : no dysuria, or hematuria        Current Outpatient Medications   Medication Sig Dispense Refill    buPROPion (WELLBUTRIN XL) 150 mg 24 hr tablet Take 1 tablet (150 mg total) by mouth every morning 90 tablet 1    KRILL OIL PO Take by mouth Daily      levothyroxine 137 mcg tablet Take 1 tablet (137 mcg total) by mouth daily 90 tablet 1    Magnesium 100 MG CAPS  "Take by mouth Patient unsure of dosage      VITAMIN D PO Take by mouth Daily      acetaminophen (TYLENOL) 325 mg tablet Take 3 tablets (975 mg total) by mouth every 8 (eight) hours as needed for mild pain  0     No current facility-administered medications for this visit.       Objective:    /86 (BP Location: Left arm, Patient Position: Sitting, Cuff Size: Standard)   Pulse 79   Temp 97.5 °F (36.4 °C) (Temporal)   Ht 5' 10\" (1.778 m)   Wt 101 kg (221 lb 12.8 oz)   SpO2 98%   BMI 31.82 kg/m²        Physical Exam      Constitutional:  Well developed, well nourished, no acute distress, non-toxic appearance   Eyes:  PERRL, conjunctiva normal , non icteric sclera  HENT:  Atraumatic, oropharynx moist. Neck-  supple   Respiratory:  CTA b/l, normal breath sounds, no rales, no wheezing   Cardiovascular:  RRR, no murmurs, no LE edema b/l  GI:  Soft, nondistended, normal bowel sounds x 4, nontender, no organomegaly, no mass, no rebound, no guarding   Neurologic:  no focal deficits noted   Psychiatric:  Speech and behavior appropriate , AAO x 3  MS: Tender to palpation left lateral epicondyle    Horacio Rodriguez DO  "

## 2025-01-15 ENCOUNTER — TELEPHONE (OUTPATIENT)
Age: 50
End: 2025-01-15

## 2025-01-15 NOTE — TELEPHONE ENCOUNTER
Pt called; his wife was seen today and tested positive for the flu and was told he can start preventative ABX as they live in the same household.     Please advise if able to start and notify pt once sent to [facility].

## 2025-01-16 DIAGNOSIS — Z20.828 EXPOSURE TO INFLUENZA: Primary | ICD-10-CM

## 2025-01-16 RX ORDER — OSELTAMIVIR PHOSPHATE 75 MG/1
75 CAPSULE ORAL EVERY 12 HOURS SCHEDULED
Qty: 10 CAPSULE | Refills: 0 | Status: SHIPPED | OUTPATIENT
Start: 2025-01-16 | End: 2025-01-21

## 2025-02-08 DIAGNOSIS — E03.9 PRIMARY HYPOTHYROIDISM: ICD-10-CM

## 2025-02-09 RX ORDER — LEVOTHYROXINE SODIUM 137 UG/1
137 TABLET ORAL DAILY
Qty: 90 TABLET | Refills: 1 | Status: SHIPPED | OUTPATIENT
Start: 2025-02-09

## 2025-02-18 ENCOUNTER — HOSPITAL ENCOUNTER (OUTPATIENT)
Dept: SLEEP CENTER | Facility: CLINIC | Age: 50
Discharge: HOME | End: 2025-02-18
Payer: COMMERCIAL

## 2025-02-18 DIAGNOSIS — R06.83 SNORING: ICD-10-CM

## 2025-02-18 PROCEDURE — G0399 HOME SLEEP TEST/TYPE 3 PORTA: HCPCS

## 2025-02-19 NOTE — PROGRESS NOTES
Home Sleep Study Documentation    HOME STUDY DEVICE: Noxturnal no                                           Allyn G3 yes      Pre-Sleep Home Study:    Set-up and instructions performed by: KS    Technician performed demonstration for Patient: yes    Return demonstration performed by Patient: yes    Written instructions provided to Patient: yes    Patient signed consent form: yes        Post-Sleep Home Study:    Additional comments by Patient: None    Home Sleep Study Failed:no:    Failure reason: N/A    Reported or Detected: N/A    Scored by: CRISTINA Gray

## 2025-02-21 PROBLEM — G47.33 OSA (OBSTRUCTIVE SLEEP APNEA): Status: ACTIVE | Noted: 2025-02-21

## 2025-02-21 PROCEDURE — 95806 SLEEP STUDY UNATT&RESP EFFT: CPT | Performed by: INTERNAL MEDICINE

## 2025-02-28 ENCOUNTER — TELEPHONE (OUTPATIENT)
Dept: SLEEP CENTER | Facility: CLINIC | Age: 50
End: 2025-02-28

## 2025-02-28 NOTE — TELEPHONE ENCOUNTER
Problem: Skin Integrity Alteration  Goal: Skin remains intact with no new/deterioration of wound or pressure injury  Outcome: Adequate for discharge  Goal: Participates in wound care activities  Outcome: Adequate for discharge     Problem: Impaired Physical Mobility  Goal: Functional status is maintained or returned to baseline during hospitalization  Outcome: Adequate for discharge  Goal: Tolerates activity for discharge setting with no abnormal symptoms  Outcome: Adequate for discharge     Problem: At Risk for Falls  Goal: Patient does not fall  Outcome: Adequate for discharge  Goal: Patient takes action to control fall-related risks  Outcome: Adequate for discharge     Problem: At Risk for Injury Due to Fall  Goal: Patient does not fall  Outcome: Adequate for discharge  Goal: Takes action to control condition specific risks  Outcome: Adequate for discharge  Goal: Verbalizes understanding of fall-related injury personal risks  Description: Document education using the patient education activity  Outcome: Adequate for discharge      05/12/2023-PT STILL IN HOSPITAL  05/25/2023-2 [facility] LO W/NURSE IN [place]  06/26/2023-6 [address] LO W/[facility] IN [facility] XRAY

## 2025-02-28 NOTE — TELEPHONE ENCOUNTER
Home study resulted, confirms mild MARIA DEL ROSARIO (KIRSTEN-9.3) with a significant hypoxic burden.     Patient to follow-up with ordering provider Dr. Rodriguez [phone].    Call placed to patient, advised of above.    Faraday message sent providing telephone number for Dr. Rodriguez's office for patient follow-up.

## 2025-04-21 ENCOUNTER — TELEPHONE (OUTPATIENT)
Age: 50
End: 2025-04-21

## 2025-04-21 DIAGNOSIS — G47.33 OSA (OBSTRUCTIVE SLEEP APNEA): Primary | ICD-10-CM

## 2025-04-21 NOTE — TELEPHONE ENCOUNTER
Pt had home sleep study done on 2/28/25 and was advised to f/u with PCP or defer treatment to Sleep Medicine. Please review and advise if pt needs sooner appt than 5/16/25 with PCP, or if an AMB Referral to SLEEP MEDICINE can be placed.    Please call pt to advise, thank you.

## 2025-05-15 ENCOUNTER — TELEPHONE (OUTPATIENT)
Age: 50
End: 2025-05-15

## 2025-05-15 ENCOUNTER — APPOINTMENT (OUTPATIENT)
Dept: LAB | Age: 50
End: 2025-05-15
Attending: FAMILY MEDICINE
Payer: COMMERCIAL

## 2025-05-15 DIAGNOSIS — E78.00 HYPERCHOLESTEREMIA: ICD-10-CM

## 2025-05-15 DIAGNOSIS — E78.00 HYPERCHOLESTEREMIA: Primary | ICD-10-CM

## 2025-05-15 LAB
CHOLEST SERPL-MCNC: 199 MG/DL (ref ?–200)
HDLC SERPL-MCNC: 49 MG/DL
LDLC SERPL CALC-MCNC: 132 MG/DL (ref 0–100)
TRIGL SERPL-MCNC: 89 MG/DL (ref ?–150)

## 2025-05-15 PROCEDURE — 36415 COLL VENOUS BLD VENIPUNCTURE: CPT

## 2025-05-15 PROCEDURE — 80061 LIPID PANEL: CPT

## 2025-05-15 NOTE — TELEPHONE ENCOUNTER
Placed new order for lipid panel. Existing order says 6/27/2025 but the lab says it is too soon to draw.

## 2025-05-15 NOTE — TELEPHONE ENCOUNTER
There are 2 lipid panel orders from the same day. One has the 12/2025 date that is to be done before yearly physical in Dec and the other has no expected date, which can be done whenever.     Called patient and let him know.

## 2025-05-15 NOTE — TELEPHONE ENCOUNTER
Joselyn called in stating pt is in for lipid panel and requested a new order bc expected date doesn't match next appt date. Please follow up pt is currently waiting at lab

## 2025-05-16 ENCOUNTER — OFFICE VISIT (OUTPATIENT)
Dept: INTERNAL MEDICINE CLINIC | Facility: OTHER | Age: 50
End: 2025-05-16
Payer: COMMERCIAL

## 2025-05-16 VITALS
OXYGEN SATURATION: 98 % | TEMPERATURE: 97.5 F | DIASTOLIC BLOOD PRESSURE: 72 MMHG | SYSTOLIC BLOOD PRESSURE: 114 MMHG | HEIGHT: 70 IN | WEIGHT: 222.8 LBS | HEART RATE: 89 BPM | BODY MASS INDEX: 31.9 KG/M2

## 2025-05-16 DIAGNOSIS — H18.513 FUCHS' CORNEAL DYSTROPHY OF BOTH EYES: ICD-10-CM

## 2025-05-16 DIAGNOSIS — Z12.2 SCREENING FOR LUNG CANCER: ICD-10-CM

## 2025-05-16 DIAGNOSIS — Z72.0 TOBACCO ABUSE: ICD-10-CM

## 2025-05-16 DIAGNOSIS — E78.00 HYPERCHOLESTEREMIA: Primary | ICD-10-CM

## 2025-05-16 PROCEDURE — 99213 OFFICE O/P EST LOW 20 MIN: CPT | Performed by: FAMILY MEDICINE

## 2025-05-16 RX ORDER — SILICONE ADHESIVE 1.5" X 3"
1 SHEET (EA) TOPICAL 4 TIMES DAILY
COMMUNITY
Start: 2025-05-05

## 2025-05-16 NOTE — PROGRESS NOTES
Assessment/Plan:    1. Hypercholesteremia  2. Tobacco abuse  3. Screening for lung cancer  -     CT Lung Screening Program; Future; Expected date: 05/16/2025  4. Fuchs' corneal dystrophy of both eyes  -     Ambulatory Referral to Ophthalmology; Future              There are no Patient Instructions on file for this visit.    Return for Next scheduled follow up.    Subjective:      Patient ID: Eitan Luque is a 49 y.o. male.    Chief Complaint   Patient presents with    Follow-up     Follow up // labs done 5/15 // discuss possible eye surgery    HM     Lung cancer screening - never done - order in  PCV - never done   TDaP - overdue       HPI    High cholesterol -not on medications, most recent lipid levels have dramatically increased and patient is not sure why or what happened.  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  He did not replace that type of exercise with anything else.  He is not sure if he has family history of high cholesterol  May 2025, significant improvement in lipid levels, patient with taking high doses of fiber for the first several months but then stopped as he did not like the taste.  Currently doing well but not really monitoring his diet or exercising.    Recently diagnosed with Fuchs dystrophy who would like a second opinion as he is not interested in surgery at this time    The following portions of the patient's history were reviewed and updated as appropriate: allergies, current medications, past family history, past medical history, past social history, past surgical history and problem list.    Review of Systems      Constitutional:  Denies fever or chills , + weight gain  Eyes:  Denies double , blurry vision or eye pain  HENT:  Denies nasal congestion, sore throat or new hearing issues  Respiratory:  Denies cough or shortness of breath or wheezing  Cardiovascular:  Denies palpitations  "or chest pain  GI:  Denies abdominal pain, nausea, or vomiting, no loose stools, no reflux  Integument:  Denies rash , no open areas  Neurologic:  Denies headache or focal weakness, no dizziness  : no dysuria, or hematuria      Current Medications[1]    Objective:    /72 (BP Location: Left arm, Patient Position: Sitting, Cuff Size: Large)   Pulse 89   Temp 97.5 °F (36.4 °C) (Temporal)   Ht 5' 10\" (1.778 m)   Wt 101 kg (222 lb 12.8 oz)   SpO2 98%   BMI 31.97 kg/m²        Physical Exam      Constitutional:  Well developed, well nourished, no acute distress, non-toxic appearance   Eyes:  PERRL, conjunctiva normal , non icteric sclera  HENT:  Atraumatic, oropharynx moist. Neck-  supple   Respiratory:  CTA b/l, normal breath sounds, no rales, no wheezing   Cardiovascular:  RRR, no murmurs, no LE edema b/l  GI:  Soft, nondistended, normal bowel sounds x 4, nontender, no organomegaly, no mass, no rebound, no guarding   Neurologic:  no focal deficits noted   Psychiatric:  Speech and behavior appropriate , AAO x 3      Horacio Rodriguez DO         [1]   Current Outpatient Medications   Medication Sig Dispense Refill    KRILL OIL PO Take by mouth Daily      levothyroxine 137 mcg tablet TAKE 1 TABLET BY MOUTH EVERY DAY 90 tablet 1    Magnesium 100 MG CAPS Take by mouth Patient unsure of dosage      sodium chloride ([place] 128) 5 % hypertonic ophthalmic solution Administer 1 drop to both eyes in the morning and 1 drop at noon and 1 drop in the evening and 1 drop before bedtime.      VITAMIN D PO Take by mouth Daily       No current facility-administered medications for this visit.     "

## 2025-08-14 ENCOUNTER — OFFICE VISIT (OUTPATIENT)
Dept: SLEEP CENTER | Facility: CLINIC | Age: 50
End: 2025-08-14
Payer: COMMERCIAL

## 2025-08-18 DIAGNOSIS — E03.9 PRIMARY HYPOTHYROIDISM: ICD-10-CM

## 2025-08-20 RX ORDER — LEVOTHYROXINE SODIUM 137 UG/1
137 TABLET ORAL DAILY
Qty: 90 TABLET | Refills: 1 | Status: SHIPPED | OUTPATIENT
Start: 2025-08-20

## (undated) DEVICE — JACKSON TABLE FOAM POSITIONING KIT: Brand: CARDINAL HEALTH

## (undated) DEVICE — INTENDED FOR TISSUE SEPARATION, AND OTHER PROCEDURES THAT REQUIRE A SHARP SURGICAL BLADE TO PUNCTURE OR CUT.: Brand: BARD-PARKER SAFETY BLADES SIZE 10, STERILE

## (undated) DEVICE — NEURO PATTIES 1/2 X 1 1/2

## (undated) DEVICE — MINOR PROCEDURE DRAPE: Brand: CONVERTORS

## (undated) DEVICE — ASTOUND STANDARD SURGICAL GOWN, XXL: Brand: CONVERTORS

## (undated) DEVICE — 4-PORT MANIFOLD: Brand: NEPTUNE 2

## (undated) DEVICE — GLOVE SRG BIOGEL ORTHOPEDIC 8

## (undated) DEVICE — ANTIBACTERIAL VIOLET BRAIDED (POLYGLACTIN 910), SYNTHETIC ABSORBABLE SUTURE: Brand: COATED VICRYL

## (undated) DEVICE — SPONGE PVP SCRUB WING STERILE

## (undated) DEVICE — SURGIFOAM 8.5 X 12.5

## (undated) DEVICE — SPECIMEN CONTAINER STERILE PEEL PACK

## (undated) DEVICE — DISPOSABLE EQUIPMENT COVER: Brand: SMALL TOWEL DRAPE

## (undated) DEVICE — INTENDED FOR TISSUE SEPARATION, AND OTHER PROCEDURES THAT REQUIRE A SHARP SURGICAL BLADE TO PUNCTURE OR CUT.: Brand: BARD-PARKER ® CARBON RIB-BACK BLADES

## (undated) DEVICE — TUBING SUCTION 5MM X 12 FT

## (undated) DEVICE — UROCATCH BAG

## (undated) DEVICE — SNAP KOVER: Brand: UNBRANDED

## (undated) DEVICE — GLOVE INDICATOR PI UNDERGLOVE SZ 7 BLUE

## (undated) DEVICE — GUIDEWIRE STRGHT TIP 0.035 IN  SOLO PLUS

## (undated) DEVICE — PACK TUR

## (undated) DEVICE — TOOL MR8-15BA50 MR8 15CM BALL 5MM: Brand: MIDAS REX MR8

## (undated) DEVICE — STERILE SURGICAL LUBRICANT,  TUBE: Brand: SURGILUBE

## (undated) DEVICE — ADHESIVE SKIN HIGH VISCOSITY EXOFIN 1ML

## (undated) DEVICE — PENCIL ELECTROSURG E-Z CLEAN -0035H

## (undated) DEVICE — URETERAL CATHETER POLLACK OPEN ENDED 5FR

## (undated) DEVICE — GLOVE SRG BIOGEL 7.5

## (undated) DEVICE — CHLORAPREP HI-LITE 26ML ORANGE

## (undated) DEVICE — SUT MONOCRYL PLUS 3-0 PS-2 27 IN MCP427H

## (undated) DEVICE — BETHLEHEM UNIVERSAL SPINE, KIT: Brand: CARDINAL HEALTH

## (undated) DEVICE — SCD SEQUENTIAL COMPRESSION COMFORT SLEEVE MEDIUM KNEE LENGTH: Brand: KENDALL SCD

## (undated) DEVICE — BASKET SPECIMEN RETRIVAL 1.9FR 120CM

## (undated) DEVICE — NEEDLE 22 G X 1 1/2 SAFETY

## (undated) DEVICE — LASER FIBER HOLMIUM 272MICRON

## (undated) DEVICE — GLOVE SRG BIOGEL 7

## (undated) DEVICE — LIGHT HANDLE COVER SLEEVE DISP BLUE STELLAR

## (undated) DEVICE — HEMOSTATIC MATRIX SURGIFLO 8ML W/THROMBIN

## (undated) DEVICE — GLOVE INDICATOR PI UNDERGLOVE SZ 8.5 BLUE

## (undated) DEVICE — DRAPE SURGIKIT SADDLE BAG